# Patient Record
Sex: MALE | Race: WHITE | NOT HISPANIC OR LATINO | Employment: OTHER | URBAN - METROPOLITAN AREA
[De-identification: names, ages, dates, MRNs, and addresses within clinical notes are randomized per-mention and may not be internally consistent; named-entity substitution may affect disease eponyms.]

---

## 2017-01-18 ENCOUNTER — TELEPHONE (OUTPATIENT)
Dept: INTERNAL MEDICINE | Facility: CLINIC | Age: 82
End: 2017-01-18

## 2017-01-18 NOTE — TELEPHONE ENCOUNTER
----- Message from Muriel Garvin sent at 1/18/2017  2:29 PM CST -----  Contact: [facility]  Pt is calling nurse staff home health care regarding paper work that was ordered by staff. Zelda call back [phone] thanks

## 2017-01-26 ENCOUNTER — ANTI-COAG VISIT (OUTPATIENT)
Dept: CARDIOLOGY | Facility: CLINIC | Age: 82
End: 2017-01-26
Payer: MEDICARE

## 2017-01-26 DIAGNOSIS — Z79.01 LONG TERM (CURRENT) USE OF ANTICOAGULANTS: Primary | ICD-10-CM

## 2017-01-26 LAB
CTP QC/QA: YES
INR PPP: 2.2 (ref 2–3)

## 2017-01-26 PROCEDURE — 99999 PR PBB SHADOW E&M-EST. PATIENT-LVL I: CPT | Mod: PBBFAC,,,

## 2017-01-26 PROCEDURE — 99211 OFF/OP EST MAY X REQ PHY/QHP: CPT | Mod: PBBFAC

## 2017-01-26 PROCEDURE — 85610 PROTHROMBIN TIME: CPT | Mod: PBBFAC

## 2017-01-26 NOTE — PROGRESS NOTES
INR remains therapeutic. Patient is more mobile since recent fall. No other changes. Continue dose and diet until follow-up.

## 2017-01-26 NOTE — MR AVS SNAPSHOT
[facility]  [address] [facility]  [place] [address]  Phone: [phone]  Fax: [phone]                  Todd Lee   2017 10:30 AM   Anti-coag visit    Description:  Male : 1931   Provider:  Kesha Killian PharmD   Department:  [facility]           Diagnoses this Visit        Comments    Long term (current) use of anticoagulants    -  Primary            To Do List           Future Appointments        Provider Department Dept Phone    2017 10:30 AM Kesha Killian PharmD [facility] [phone]    2017 10:30 AM Kesha Killian PharmD [facility] [phone]    2017 8:30 AM Jalil Sorto OD [facility] [phone]    2017 2:20 PM Elmer Calderon IV, MD [facility] [phone]      Goals (5 Years of Data)     None      [facility]     [facility] On Call Nurse Care Line -  Assistance  Registered nurses in the [facility] On Call Center provide clinical advisement, health education, appointment booking, and other advisory services.  Call for this free service at [phone].             Medications           Message regarding Medications     Verify the changes and/or additions to your medication regime listed below are the same as discussed with your clinician today.  If any of these changes or additions are incorrect, please notify your healthcare provider.             Verify that the below list of medications is an accurate representation of the medications you are currently taking.  If none reported, the list may be blank. If incorrect, please contact your healthcare provider. Carry this list with you in case of emergency.           Current Medications     acetaminophen-codeine 300-30mg (TYLENOL #3) 300-30 mg Tab Take 1 tablet by mouth every 8 (eight) hours as needed.    atorvastatin (LIPITOR) 10 MG tablet TAKE 1 TABLET EVERY DAY    felodipine (PLENDIL) 2.5 MG Tb24 TAKE 1 TABLET EVERY DAY     fluticasone (FLONASE) 50 mcg/actuation nasal spray 1 spray by Each Nare route once daily.    hydrocodone-acetaminophen 10-325mg (NORCO)  mg Tab Take 1 tablet by mouth every 4 (four) hours as needed.    loratadine (CLARITIN) 10 mg tablet Take 1 tablet (10 mg total) by mouth once daily.    meclizine (ANTIVERT) 25 mg tablet Take 1 tablet (25 mg total) by mouth 3 (three) times daily as needed.    warfarin (COUMADIN) 7.5 MG tablet Take 1/2 tablet onand Fridays and 1 tablet all remaining days as directed by the coumadin clinic.           Clinical Reference Information           Allergies as of 1/26/2017     No Known Drug Allergies      Immunizations Administered on Date of Encounter - 1/26/2017     None      Orders Placed During Today's Visit      Normal Orders This Visit    POCT INR          1/26/2017 10:17 AM - Monroe NicholsD      Component Results     Component Value Flag Ref Range Units Status    INR 2.2  2.0 - 3.0  Final     Acceptable Yes    Final      December 2016 Details    Sun Mon Tue Wed Thu Fri Sat         1               2               3                 4               5               6               7               8               9               10                 11               12               13               14               15               16               17                 18               19               20               21               22               23               24                 25               26               27      7.5 mg         28      7.5 mg         29   1.9   7.5 mg   See details      30      3.75 mg         31      7.5 mg          Date Details   12/29 Last INR check   INR: 1.9                 January 2017 Details    Sun Mon Tue Wed Thu Fri Sat     1      7.5 mg         2      7.5 mg         3      7.5 mg         4      7.5 mg         5      7.5 mg         6      3.75 mg         7      7.5 mg           8      7.5 mg         9      7.5 mg          10      7.5 mg         11      7.5 mg         12      7.5 mg         13      3.75 mg         14      7.5 mg           15      7.5 mg         16      7.5 mg         17      7.5 mg         18      7.5 mg         19      7.5 mg         20      3.75 mg         21      7.5 mg           22      7.5 mg         23      7.5 mg         24      7.5 mg         25      7.5 mg         26   2.2   7.5 mg   See details      27      3.75 mg         28      7.5 mg           29      7.5 mg         30      7.5 mg         31      7.5 mg              Date Details   01/26 This INR check   INR: 2.2                     How to take your warfarin dose     To take:  3.75 mg Take 0.5 of a 7.5 mg tablet.    To take:  7.5 mg Take 1 of the 7.5 mg tablets.           February 2017 Details    Sun Mon Tue Wed Thu Fri Sat        1      7.5 mg         2      7.5 mg         3      3.75 mg         4      7.5 mg           5      7.5 mg         6      7.5 mg         7      7.5 mg         8      7.5 mg         9      7.5 mg         10      3.75 mg         11      7.5 mg           12      7.5 mg         13      7.5 mg         14      7.5 mg         15      7.5 mg         16      7.5 mg         17      3.75 mg         18      7.5 mg           19      7.5 mg         20      7.5 mg         21      7.5 mg         22      7.5 mg         23            24               25                 26               27               28                    Date Details   No additional details    Date of next INR:  2/23/2017         How to take your warfarin dose     To take:  3.75 mg Take 0.5 of a 7.5 mg tablet.    To take:  7.5 mg Take 1 of the 7.5 mg tablets.           Anticoagulation Summary as of 1/26/2017     Maintenance plan 3.75 mg (7.5 mg x 0.5) on Fri; 7.5 mg (7.5 mg x 1) all other days    Full instructions 3.75 mg on Fri; 7.5 mg all other days    Next INR check 2/23/2017      Anticoagulation Episode Summary     Comments       Patient Findings     Negatives  Signs/symptoms of thrombosis, Signs/symptoms of bleeding, Laboratory test error suspected, Change in health, Change in alcohol use, Change in activity, Upcoming invasive procedure, Emergency department visit, Upcoming dental procedure, Missed doses, Extra doses, Change in medications, Change in diet/appetite, Hospital admission, Bruising, Other complaints      [facility] Sign-Up     Activating your [facility] account is as easy as 1-2-3!     1) Visit [email or web address], select Sign Up Now, enter this activation code and your date of birth, then select Next.  [email or web address]  Expires: 3/12/2017 10:18 AM      2) Create a username and password to use when you visit [facility] in the future and select a security question in case you lose your password and select Next.    3) Enter your e-mail address and click Sign Up!    Additional Information  If you have questions, please e-mail [email or web address] or call [phone] to talk to our [facility] staff. Remember, [facility] is NOT to be used for urgent needs. For medical emergencies, dial 911.

## 2017-02-20 ENCOUNTER — HOSPITAL ENCOUNTER (EMERGENCY)
Facility: HOSPITAL | Age: 82
Discharge: HOME | End: 2017-02-20
Attending: EMERGENCY MEDICINE
Payer: MEDICARE

## 2017-02-20 VITALS
DIASTOLIC BLOOD PRESSURE: 86 MMHG | OXYGEN SATURATION: 94 % | RESPIRATION RATE: 18 BRPM | WEIGHT: 200 LBS | BODY MASS INDEX: 27.09 KG/M2 | TEMPERATURE: 98 F | HEIGHT: 72 IN | HEART RATE: 82 BPM | SYSTOLIC BLOOD PRESSURE: 146 MMHG

## 2017-02-20 DIAGNOSIS — T14.8XXA BRUISING: ICD-10-CM

## 2017-02-20 DIAGNOSIS — M54.9 BACK PAIN, UNSPECIFIED BACK LOCATION, UNSPECIFIED BACK PAIN LATERALITY, UNSPECIFIED CHRONICITY: Primary | ICD-10-CM

## 2017-02-20 LAB
BILIRUB UR QL STRIP: NEGATIVE
CLARITY UR: CLEAR
COLOR UR: YELLOW
GLUCOSE UR QL STRIP: ABNORMAL
HGB UR QL STRIP: ABNORMAL
INR PPP: 3.3
KETONES UR QL STRIP: NEGATIVE
LEUKOCYTE ESTERASE UR QL STRIP: NEGATIVE
NITRITE UR QL STRIP: NEGATIVE
PH UR STRIP: 6 [PH] (ref 5–8)
PROT UR QL STRIP: NEGATIVE
PROTHROMBIN TIME: 32.3 SEC
SP GR UR STRIP: >=1.03 (ref 1–1.03)
URN SPEC COLLECT METH UR: ABNORMAL
UROBILINOGEN UR STRIP-ACNC: 1 EU/DL

## 2017-02-20 PROCEDURE — 81003 URINALYSIS AUTO W/O SCOPE: CPT

## 2017-02-20 PROCEDURE — 99284 EMERGENCY DEPT VISIT MOD MDM: CPT

## 2017-02-20 PROCEDURE — 85610 PROTHROMBIN TIME: CPT

## 2017-02-20 NOTE — ED AVS SNAPSHOT
[facility]  [address] [facility] [address]               Todd Lee   2017  9:13 PM   ED    Description:  Male : 1931   Department:  [facility]            Your Care was Coordinated By:     Provider Role From To    Mandy Lu MD Attending Provider [phone] --      Reason for Visit     Bleeding/Bruising           Diagnoses this Visit        Comments    Back pain, unspecified back location, unspecified back pain laterality, unspecified chronicity    -  Primary     Bruising           ED Disposition     ED Disposition Condition Comment    Discharge             To Do List           [facility]     [facility] Care [facility] -  Assistance  Registered nurses in the [facility] provide clinical advisement, health education, appointment booking, and other advisory services.  Call for this free service at [phone].             Medications           Message regarding Medications     Verify the changes and/or additions to your medication regime listed below are the same as discussed with your clinician today.  If any of these changes or additions are incorrect, please notify your healthcare provider.             Verify that the below list of medications is an accurate representation of the medications you are currently taking.  If none reported, the list may be blank. If incorrect, please contact your healthcare provider. Carry this list with you in case of emergency.           Current Medications     acetaminophen-codeine 300-30mg (TYLENOL #3) 300-30 mg Tab Take 1 tablet by mouth every 8 (eight) hours as needed.    atorvastatin (LIPITOR) 10 MG tablet TAKE 1 TABLET EVERY DAY    felodipine (PLENDIL) 2.5 MG Tb24 TAKE 1 TABLET EVERY DAY    fluticasone (FLONASE) 50 mcg/actuation nasal spray 1 spray by Each Nare route once daily.    hydrocodone-acetaminophen 10-325mg (NORCO)  mg Tab Take 1 tablet by  mouth every 4 (four) hours as needed.    loratadine (CLARITIN) 10 mg tablet Take 1 tablet (10 mg total) by mouth once daily.    meclizine (ANTIVERT) 25 mg tablet Take 1 tablet (25 mg total) by mouth 3 (three) times daily as needed.    warfarin (COUMADIN) 7.5 MG tablet Take 1/2 tablet onand Fridays and 1 tablet all remaining days as directed by the coumadin clinic.           Clinical Reference Information           Your Vitals Were     BP Pulse Temp Resp Height Weight    146/86 (BP Location: Right arm, Patient Position: Sitting, BP Method: Automatic) 82 98 °F (36.7 °C) (Oral) 18 6' (1.829 m) 90.7 kg (200 lb)    SpO2 BMI             94% 27.12 kg/m2         Allergies as of 2/20/2017        Reactions    No Known Drug Allergies       Immunizations Administered on Date of Encounter - 2/20/2017     None      ED Micro, Lab, POCT     Start Ordered       Status Ordering Provider    02/20/17 2114 02/20/17 2113  Urinalysis  STAT      Final result     02/20/17 2051 02/20/17 2051  Protime-INR  STAT      Final result       ED Imaging Orders     Start Ordered       Status Ordering Provider    02/20/17 2114 02/20/17 2113  X-Ray Lumbar Spine Complete 5 View  1 time imaging      Final result         Discharge Instructions         Self-Care for Low Back Pain    Most people have low back pain now and then. In many cases, it isnt serious and self-care can help. Sometimes low back pain can be a sign of a bigger problem. Call your healthcare provider if your pain returns often or gets worse over time. For the long-term care of your back, get regular exercise, lose any excess weight and learn good posture.  Take a short rest  Lying down during the day may be beneficial for short periods of time if severe pain increases with sitting or standing. Long-term bed rest could be detrimental.  Reduce pain and swelling  Cold reduces swelling. Both cold and heat can reduce pain. Protect your skin by placing a towel between your body and the ice or  heat source.  · For the first few days, apply an ice pack for 15 to 20 minutes .  · After the first few days, try heat for 15 minutes at a time to ease pain. Never sleep on a heating pad.  · Over-the-counter medicine can help control pain and swelling. Try aspirin or ibuprofen.  Exercise  Exercise can help your back heal. It also helps your back get stronger and more flexible, preventing any reinjury. Ask your healthcare provider about specific exercises for your back.  Use good posture to avoid reinjury  · When moving, bend at the hips and knees. Dont bend at the waist or twist around.  · When lifting, keep the object close to your body. Dont try to lift more than you can handle.  · When sitting, keep your lower back supported. Use a rolled-up towel as needed.  Seek immediate medical care if:  · Youre unable to stand or walk.  · You have a temperature over 100.4°F (38.0°C)  · You have frequent, painful, or bloody urination.  · You have severe abdominal pain.  · You have a sharp, stabbing pain.  · Your pain is constant.  · You have pain or numbness in your leg.  · You feel pain in a new area of your back.  · You notice that the pain isnt decreasing after more than a week.   Date Last Reviewed: 9/29/2015  © 9495-2100 [employer]. [address] [place] [address]. All rights reserved. This information is not intended as a substitute for professional medical care. Always follow your healthcare professional's instructions.          Your Scheduled Appointments     Feb 23, 2017 11:00 AM CST   [facility]madin with [facility] ([facility]    [address] [place] [id number]   [phone]            Feb 23, 2017 11:20 AM CST   Established Patient Visit with MD MARCO ANTONIO [facility] ([facility]    [address] [place] [address]   [phone]            Sep 27, 2017  8:30 AM CDT   Established Patient Visit with  Jalil Sorto OD   [facility]    [address] [place] [address]   [phone]            Dec 21, 2017  2:20 PM CST   Established Patient Visit with Elmer Calderon IV, MD   [facility] ([facility])    [address] [place] [address]   [phone]              [facility] Sign-Up     Activating your [facility] account is as easy as 1-2-3!     1) Visit [email or web address], select Sign Up Now, enter this activation code and your date of birth, then select Next.  [email or web address]  Expires: 3/12/2017 10:18 AM      2) Create a username and password to use when you visit [facility] in the future and select a security question in case you lose your password and select Next.    3) Enter your e-mail address and click Sign Up!    Additional Information  If you have questions, please e-mail [email or web address] or call [phone] to talk to our [facility] staff. Remember, [facility] is NOT to be used for urgent needs. For medical emergencies, dial 911.          [facility] complies with applicable Federal civil rights laws and does not discriminate on the basis of race, color, national origin, age, disability, or sex.        Language Assistance Services     ATTENTION: Language assistance services are available, free of charge. Please call [phone].      ATENCIÓN: Si habla español, tiene a livingston disposición servicios gratuitos de asistencia lingüística. Llame al [phone].     CHÚ Ý: N?u b?n nói Ti?ng Vi?t, có các d?ch v? h? tr? ngôn ng? mi?n phí dành cho b?n. G?i s? [phone].

## 2017-02-21 NOTE — ED PROVIDER NOTES
SCRIBE #1 NOTE: I, Anne Enrique, am scribing for, and in the presence of, Mandy Lu MD. I have scribed the entire note.      History      Chief Complaint   Patient presents with    Bleeding/Bruising     pt takes coumadin. reports bruising to L leg.        Review of patient's allergies indicates:   Allergen Reactions    No known drug allergies         HPI   HPI    2/20/2017, 9:11 PM   History obtained from the patient and family members      History of Present Illness: Todd Lee is a 85 y.o. male patient with PHHx of blood clots in heart and lungs who presents to the Emergency Department for bleeding and bruising that was just noticed today. Bruising is located to L leg. Per family members, pt fell about 2 weeks ago. No mitigating or exacerbating factors reported. No associated sxs at this time. Pt also c/o back pain and frequent urination during the night. Patient denies any fever, chills, CP, SOB, saddle anesthesia, bowel/bladder incontinence, and all other sxs at this time. Pt currently on Coumadin. No further complaints or concerns at this time.       Arrival mode: Personal vehicle    PCP: Carmelita Sanchez MD       Past Medical History:  Past Medical History   Diagnosis Date    Blood clotting disorder     Cataract      both eyes in [place]    Diabetes mellitus type II 09/22/2016     no medicine today no BS    Elevated PSA      Has had evaluation by Dr. Blackman; Recommended against PSA test and advised annual CHANDAN    Hyperlipidemia     Hypertension     Osteoarthritis        Past Surgical History:  Past Surgical History   Procedure Laterality Date    Left inguinal hernia repair      Colonoscopy           Family History:  Family History   Problem Relation Age of Onset    Stroke Mother     Cancer Father        Social History:  Social History     Social History Main Topics    Smoking status: Never Smoker    Smokeless tobacco: Never Used    Alcohol use No    Drug use: No    Sexual  activity: Not Currently       ROS   Review of Systems   Constitutional: Negative for fever.   HENT: Negative for sore throat.    Respiratory: Negative for shortness of breath.    Cardiovascular: Negative for chest pain.   Gastrointestinal: Negative for nausea.        (-) bowel incontinence   Genitourinary: Positive for frequency. Negative for dysuria.        (-) bladder incontinence   Musculoskeletal: Positive for back pain.   Skin: Negative for rash.        (+) bruising to L leg   Neurological: Negative for weakness.        (-) saddle anesthesia   Hematological: Bruises/bleeds easily.   All other systems reviewed and are negative.      Physical Exam    Initial Vitals   BP Pulse Resp Temp SpO2   02/20/17 1915 02/20/17 1915 02/20/17 1915 02/20/17 1915 02/20/17 1915   151/99 75 20 98 °F (36.7 °C) 93 %      Physical Exam  Nursing Notes and Vital Signs Reviewed.  Constitutional: Patient is in no acute distress. Awake and alert. Well-developed and well-nourished.  Head: Atraumatic. Normocephalic.  Eyes: PERRL. EOM intact. Conjunctivae are not pale. No scleral icterus.  ENT: Mucous membranes are moist. Oropharynx is clear and symmetric.    Neck: Supple. Full ROM. No lymphadenopathy.  Cardiovascular: Regular rate. Regular rhythm. No murmurs, rubs, or gallops. Distal pulses are 2+ and symmetric.  Pulmonary/Chest: No respiratory distress. Clear to auscultation bilaterally. No wheezing, rales, or rhonchi.  Abdominal: Soft and non-distended.  There is no tenderness.  No rebound, guarding, or rigidity. Good bowel sounds.  Genitourinary: No CVA tenderness  Musculoskeletal: Moves all extremities. No obvious deformities. No edema. No calf tenderness.  Skin: Warm and dry. Bruising to L leg.  Neurological:  Alert, awake, and appropriate.  Normal speech.  No acute focal neurological deficits are appreciated.  Psychiatric: Normal affect. Good eye contact. Appropriate in content.    ED Course    Procedures  ED Vital Signs:  Vitals:     02/20/17 1915 02/20/17 2228   BP: (!) 151/99 (!) 146/86   Pulse: 75 82   Resp: 20 18   Temp: 98 °F (36.7 °C)    TempSrc: Oral    SpO2: (!) 93% (!) 94%   Weight: 90.7 kg (200 lb)    Height: 6' (1.829 m)        Abnormal Lab Results:  Labs Reviewed   PROTIME-INR - Abnormal; Notable for the following:        Result Value    Prothrombin Time 32.3 (*)     INR 3.3 (*)     All other components within normal limits   URINALYSIS - Abnormal; Notable for the following:     Specific Gravity, UA >=1.030 (*)     Glucose, UA 2+ (*)     Occult Blood UA Trace (*)     All other components within normal limits        All Lab Results:  Results for orders placed or performed during the hospital encounter of 02/20/17   Protime-INR   Result Value Ref Range    Prothrombin Time 32.3 (H) 9.0 - 12.5 sec    INR 3.3 (H) 0.8 - 1.2   Urinalysis   Result Value Ref Range    Specimen UA Urine, Clean Catch     Color, UA Yellow Yellow, Straw, Madai    Appearance, UA Clear Clear    pH, UA 6.0 5.0 - 8.0    Specific Gravity, UA >=1.030 (A) 1.005 - 1.030    Protein, UA Negative Negative    Glucose, UA 2+ (A) Negative    Ketones, UA Negative Negative    Bilirubin (UA) Negative Negative    Occult Blood UA Trace (A) Negative    Nitrite, UA Negative Negative    Urobilinogen, UA 1.0 <2.0 EU/dL    Leukocytes, UA Negative Negative         Imaging Results:  Imaging Results         X-Ray Lumbar Spine Complete 5 View (Final result) Result time:  02/20/17 21:50:38    Final result by Yessenia Mack MD (02/20/17 21:50:38)    Impression:      1.  There is some mild compression deformity of superior endplate of L3.  This is of uncertain age.  Lesser compression of the L1 vertebral body superior endplate is present.  2.  Chronic degenerative changes are present greatest at the L4-L5 level.      Electronically signed by: YESSENIA MACK MD  Date:     02/20/17  Time:    21:50     Narrative:    Exam: XR LUMBAR SPINE COMPLETE 5 VIEW,    Date:  02/20/17 21:34:57    History:  No history is provided.    Comparison:  No prior relevant studies available    Findings: There is compression deformity of the superior endplate of L3 vertebral body with mild loss of height of the vertebral body at this level.  Some lesser loss of height superior endplate of L1 is present.  The bones are demineralized with some chronic spurring and degenerative change present.  There is moderate disc space narrowing at the L4-L5 level.                      The Emergency Provider reviewed the vital signs and test results, which are outlined above.    ED Discussion       11:05 PM: Re-evaluated pt. D/w pt all pertinent results. D/w pt any concerns expressed at this time. Answered all questions. Pt expresses understanding at this time.    11:05 PM: Reassessed pt at this time. Discussed with pt all pertinent ED information and results. Discussed pt dx and plan of tx. Gave pt all f/u and return to the ED instructions. All questions and concerns were addressed at this time. Pt expresses understanding of information and instructions, and is comfortable with plan to discharge. Pt is stable for discharge.    Pre-hypertension/Hypertension: The pt has been informed that they may have pre-hypertension or hypertension based on a blood pressure reading in the ED. I recommend that the pt call the PCP listed on their discharge instructions or a physician of their choice this week to arrange f/u for further evaluation of possible pre-hypertension or hypertension.       ED Medication(s):  Medications - No data to display    Discharge Medication List as of 2/20/2017 10:36 PM                Medical Decision Making    Medical Decision Making:   Clinical Tests:   Lab Tests: Ordered and Reviewed  Radiological Study: Ordered and Reviewed           Scribe Attestation:   Scribe #1: I performed the above scribed service and the documentation accurately describes the services I performed. I attest to the accuracy of the note.    Attending:    Physician Attestation Statement for Scribe #1: I, Mandy Lu MD, personally performed the services described in this documentation, as scribed by Anne Enrique, in my presence, and it is both accurate and complete.          Clinical Impression       ICD-10-CM ICD-9-CM   1. Back pain, unspecified back location, unspecified back pain laterality, unspecified chronicity M54.9 724.5   2. Bruising T14.8 924.9       Disposition:   Disposition: Discharged  Condition: Stable         Mandy Lu MD  02/21/17 0556

## 2017-02-21 NOTE — DISCHARGE INSTRUCTIONS
Self-Care for Low Back Pain    Most people have low back pain now and then. In many cases, it isnt serious and self-care can help. Sometimes low back pain can be a sign of a bigger problem. Call your healthcare provider if your pain returns often or gets worse over time. For the long-term care of your back, get regular exercise, lose any excess weight and learn good posture.  Take a short rest  Lying down during the day may be beneficial for short periods of time if severe pain increases with sitting or standing. Long-term bed rest could be detrimental.  Reduce pain and swelling  Cold reduces swelling. Both cold and heat can reduce pain. Protect your skin by placing a towel between your body and the ice or heat source.  · For the first few days, apply an ice pack for 15 to 20 minutes .  · After the first few days, try heat for 15 minutes at a time to ease pain. Never sleep on a heating pad.  · Over-the-counter medicine can help control pain and swelling. Try aspirin or ibuprofen.  Exercise  Exercise can help your back heal. It also helps your back get stronger and more flexible, preventing any reinjury. Ask your healthcare provider about specific exercises for your back.  Use good posture to avoid reinjury  · When moving, bend at the hips and knees. Dont bend at the waist or twist around.  · When lifting, keep the object close to your body. Dont try to lift more than you can handle.  · When sitting, keep your lower back supported. Use a rolled-up towel as needed.  Seek immediate medical care if:  · Youre unable to stand or walk.  · You have a temperature over 100.4°F (38.0°C)  · You have frequent, painful, or bloody urination.  · You have severe abdominal pain.  · You have a sharp, stabbing pain.  · Your pain is constant.  · You have pain or numbness in your leg.  · You feel pain in a new area of your back.  · You notice that the pain isnt decreasing after more than a week.   Date Last Reviewed: 9/29/2015  ©  9386-3652 [employer]. [address] [place] [address], [place] [address]. All rights reserved. This information is not intended as a substitute for professional medical care. Always follow your healthcare professional's instructions.

## 2017-02-23 ENCOUNTER — OFFICE VISIT (OUTPATIENT)
Dept: INTERNAL MEDICINE | Facility: CLINIC | Age: 82
End: 2017-02-23
Payer: MEDICARE

## 2017-02-23 ENCOUNTER — ANTI-COAG VISIT (OUTPATIENT)
Dept: CARDIOLOGY | Facility: CLINIC | Age: 82
End: 2017-02-23
Payer: MEDICARE

## 2017-02-23 VITALS
HEART RATE: 93 BPM | SYSTOLIC BLOOD PRESSURE: 118 MMHG | BODY MASS INDEX: 28.25 KG/M2 | HEIGHT: 72 IN | WEIGHT: 208.56 LBS | TEMPERATURE: 97 F | OXYGEN SATURATION: 97 % | DIASTOLIC BLOOD PRESSURE: 62 MMHG

## 2017-02-23 DIAGNOSIS — M54.50 ACUTE MIDLINE LOW BACK PAIN WITHOUT SCIATICA: Primary | ICD-10-CM

## 2017-02-23 DIAGNOSIS — S32.030D COMPRESSION FRACTURE OF L3 LUMBAR VERTEBRA, WITH ROUTINE HEALING, SUBSEQUENT ENCOUNTER: ICD-10-CM

## 2017-02-23 DIAGNOSIS — Z79.01 LONG TERM (CURRENT) USE OF ANTICOAGULANTS: Primary | ICD-10-CM

## 2017-02-23 DIAGNOSIS — S32.010D: ICD-10-CM

## 2017-02-23 LAB — INR PPP: 3.7 (ref 2–3)

## 2017-02-23 PROCEDURE — 99213 OFFICE O/P EST LOW 20 MIN: CPT | Mod: PBBFAC | Performed by: FAMILY MEDICINE

## 2017-02-23 PROCEDURE — 99214 OFFICE O/P EST MOD 30 MIN: CPT | Mod: S$PBB,,, | Performed by: FAMILY MEDICINE

## 2017-02-23 PROCEDURE — 99999 PR PBB SHADOW E&M-EST. PATIENT-LVL I: CPT | Mod: PBBFAC,,,

## 2017-02-23 PROCEDURE — 99999 PR PBB SHADOW E&M-EST. PATIENT-LVL III: CPT | Mod: PBBFAC,,, | Performed by: FAMILY MEDICINE

## 2017-02-23 RX ORDER — HYDROCODONE BITARTRATE AND ACETAMINOPHEN 10; 325 MG/1; MG/1
1 TABLET ORAL EVERY 4 HOURS PRN
Qty: 20 TABLET | Refills: 0 | Status: SHIPPED | OUTPATIENT
Start: 2017-02-23

## 2017-02-23 NOTE — PROGRESS NOTES
INR is supra-therapeutic. No bleeding issues. More tylenol usage this week for back pain. Hold x1 dose then re-challenge dose until follow-up.

## 2017-02-23 NOTE — MR AVS SNAPSHOT
[facility]  [address] [facility] [address]  Phone: [phone]  Fax: [phone]                  Todd Lee   2017 11:20 AM   Office Visit    Description:  Male : 1931   Provider:  Marjan Forman MD   Department:  [facility]           Reason for Visit     Back Pain     Fall     Hospital Follow Up           Diagnoses this Visit        Comments    Acute midline low back pain without sciatica    -  Primary     Compression fracture of first lumbar vertebra, with routine healing, subsequent encounter         Compression fracture of L3 lumbar vertebra, with routine healing, subsequent encounter                To Do List           Future Appointments        Provider Department Dept Phone    3/16/2017 11:00 AM Kesha Killian PharmD [facility] Coumadin [phone]    2017 8:30 AM Jalil Sorto OD [facility] - Ophthalmology [phone]    2017 2:20 PM Elmer Calderon IV, MD [facility] - Urology [phone]      Goals (5 Years of Data)     None       These Medications        Disp Refills Start End    hydrocodone-acetaminophen 10-325mg (NORCO)  mg Tab 20 tablet 0 2017     Take 1 tablet by mouth every 4 (four) hours as needed. - Oral    Pharmacy: [facility] [address] #: [phone]         [facility]     [facility] On Call Nurse Care Line -  Assistance  Registered nurses in the [facility] On Call Center provide clinical advisement, health education, appointment booking, and other advisory services.  Call for this free service at [phone].             Medications           Message regarding Medications     Verify the changes and/or additions to your medication regime listed below are the same as discussed with your clinician today.  If any of these changes or additions are incorrect, please notify your healthcare provider.             Verify that the below list of  medications is an accurate representation of the medications you are currently taking.  If none reported, the list may be blank. If incorrect, please contact your healthcare provider. Carry this list with you in case of emergency.           Current Medications     acetaminophen-codeine 300-30mg (TYLENOL #3) 300-30 mg Tab Take 1 tablet by mouth every 8 (eight) hours as needed.    atorvastatin (LIPITOR) 10 MG tablet TAKE 1 TABLET EVERY DAY    felodipine (PLENDIL) 2.5 MG Tb24 TAKE 1 TABLET EVERY DAY    fluticasone (FLONASE) 50 mcg/actuation nasal spray 1 spray by Each Nare route once daily.    hydrocodone-acetaminophen 10-325mg (NORCO)  mg Tab Take 1 tablet by mouth every 4 (four) hours as needed.    loratadine (CLARITIN) 10 mg tablet Take 1 tablet (10 mg total) by mouth once daily.    meclizine (ANTIVERT) 25 mg tablet Take 1 tablet (25 mg total) by mouth 3 (three) times daily as needed.    warfarin (COUMADIN) 7.5 MG tablet Take 1/2 tablet onand Fridays and 1 tablet all remaining days as directed by the coumadin clinic.           Clinical Reference Information           Your Vitals Were     BP Pulse Temp Height    118/62 (BP Location: Right arm, Patient Position: Sitting, BP Method: Manual) 93 96.5 °F (35.8 °C) (Tympanic) 6' (1.829 m)    Weight SpO2 BMI    94.6 kg (208 lb 8.9 oz) 97% 28.29 kg/m2      Blood Pressure          Most Recent Value    BP  118/62      Allergies as of 2/23/2017     No Known Drug Allergies      Immunizations Administered on Date of Encounter - 2/23/2017     None      Orders Placed During Today's Visit      Normal Orders This Visit    Ambulatory referral to Home Health       [facility] Sign-Up     Activating your [facility] account is as easy as 1-2-3!     1) Visit [email or web address], select Sign Up Now, enter this activation code and your date of birth, then select Next.  [email or web address]  Expires: 3/12/2017 10:18 AM      2) Create a username and password to use when you visit [facility] in the  future and select a security question in case you lose your password and select Next.    3) Enter your e-mail address and click Sign Up!    Additional Information  If you have questions, please e-mail [email or web address] or call [phone] to talk to our [facility] staff. Remember, [facility] is NOT to be used for urgent needs. For medical emergencies, dial 911.         Language Assistance Services     ATTENTION: Language assistance services are available, free of charge. Please call [phone].      ATENCIÓN: Si habla español, tiene a livingston disposición servicios gratuitos de asistencia lingüística. Llame al [phone].     CHÚ Ý: N?u b?n nói Ti?ng Vi?t, có các d?ch v? h? tr? ngôn ng? mi?n phí dành cho b?n. G?i s? [phone].         [facility] complies with applicable Federal civil rights laws and does not discriminate on the basis of race, color, national origin, age, disability, or sex.

## 2017-02-23 NOTE — MR AVS SNAPSHOT
[facility]  [address] [facility]  [place] [address]  Phone: [phone]  Fax: [phone]                  Todd Lee   2017 11:00 AM   Anti-coag visit    Description:  Male : 1931   Provider:  Kesha Killian PharmD   Department:  [facility]           Diagnoses this Visit        Comments    Long term (current) use of anticoagulants    -  Primary            To Do List           Future Appointments        Provider Department Dept Phone    2017 11:20 AM Marjan Forman MD [facility] [phone]    3/16/2017 11:00 AM Kesha Kililan PharmD [facility] [phone]    2017 8:30 AM Jalil Sorto OD [facility] [phone]    2017 2:20 PM [facility] Calder[facility] [phone]      Goals (5 Years of Data)     None      [facility] On Call     [facility] On Call Nurse Care Line -  Assistance  Registered nurses in the [facility] On Call Center provide clinical advisement, health education, appointment booking, and other advisory services.  Call for this free service at [phone].             Medications           Message regarding Medications     Verify the changes and/or additions to your medication regime listed below are the same as discussed with your clinician today.  If any of these changes or additions are incorrect, please notify your healthcare provider.             Verify that the below list of medications is an accurate representation of the medications you are currently taking.  If none reported, the list may be blank. If incorrect, please contact your healthcare provider. Carry this list with you in case of emergency.           Current Medications     acetaminophen-codeine 300-30mg (TYLENOL #3) 300-30 mg Tab Take 1 tablet by mouth every 8 (eight) hours as needed.    atorvastatin (LIPITOR) 10 MG tablet TAKE 1 TABLET EVERY DAY    felodipine (PLENDIL) 2.5 MG Tb24 TAKE 1 TABLET EVERY  DAY    fluticasone (FLONASE) 50 mcg/actuation nasal spray 1 spray by Each Nare route once daily.    hydrocodone-acetaminophen 10-325mg (NORCO)  mg Tab Take 1 tablet by mouth every 4 (four) hours as needed.    loratadine (CLARITIN) 10 mg tablet Take 1 tablet (10 mg total) by mouth once daily.    meclizine (ANTIVERT) 25 mg tablet Take 1 tablet (25 mg total) by mouth 3 (three) times daily as needed.    warfarin (COUMADIN) 7.5 MG tablet Take 1/2 tablet onand Fridays and 1 tablet all remaining days as directed by the coumadin clinic.           Clinical Reference Information           Allergies as of 2/23/2017     No Known Drug Allergies      Immunizations Administered on Date of Encounter - 2/23/2017     None      Orders Placed During Today's Visit      Normal Orders This Visit    POCT INR          2/23/2017 11:07 AM - Kesha Killian, PharmD      Component Results     Component Value Flag Ref Range Units Status    INR 3.7 (A) 2.0 - 3.0  Final      January 2017 Details    Sun Mon Tue Wed Thu Fri Sat     1               2               3               4               5               6               7                 8               9               10               11               12               13               14                 15               16               17               18               19               20               21                 22               23               24      7.5 mg         25      7.5 mg         26   2.2   7.5 mg   See details      27      3.75 mg         28      7.5 mg           29      7.5 mg         30      7.5 mg         31      7.5 mg              Date Details   01/26 Last INR check   INR: 2.2                 February 2017 Details    Sun Mon Tue Wed Thu Fri Sat        1      7.5 mg         2      7.5 mg         3      3.75 mg         4      7.5 mg           5      7.5 mg         6      7.5 mg         7      7.5 mg         8      7.5 mg         9      7.5 mg         10       3.75 mg         11      7.5 mg           12      7.5 mg         13      7.5 mg         14      7.5 mg         15      7.5 mg         16      7.5 mg         17      3.75 mg         18      7.5 mg           19      7.5 mg         20   3.3   7.5 mg   See details      21      7.5 mg         22      7.5 mg         23   3.7   Hold   See details      24      3.75 mg         25      7.5 mg           26      7.5 mg         27      7.5 mg         28      7.5 mg              Date Details   02/20 INR: 3.3   Coumadin Therapy: 2.0 - 3.0 for INR for all indicators except mechanical heart valves and antiphospholipid syndromes which should use 2.5 - 3.5.       02/23 This INR check   INR: 3.7                     How to take your warfarin dose     To take:  3.75 mg Take 0.5 of a 7.5 mg tablet.    To take:  7.5 mg Take 1 of the 7.5 mg tablets.    Hold Do not take your warfarin dose. See the Details table to the right for additional instructions.                March 2017 Details    Sun Mon Tue Wed Thu Fri Sat        1      7.5 mg         2      7.5 mg         3      3.75 mg         4      7.5 mg           5      7.5 mg         6      7.5 mg         7      7.5 mg         8      7.5 mg         9      7.5 mg         10      3.75 mg         11      7.5 mg           12      7.5 mg         13      7.5 mg         14      7.5 mg         15      7.5 mg         16            17               18                 19               20               21               22               23               24               25                 26               27               28               29               30               31                 Date Details   No additional details    Date of next INR:  3/16/2017         How to take your warfarin dose     To take:  3.75 mg Take 0.5 of a 7.5 mg tablet.    To take:  7.5 mg Take 1 of the 7.5 mg tablets.           Anticoagulation Summary as of 2/23/2017     Maintenance plan 3.75 mg (7.5 mg x 0.5) on Fri; 7.5 mg  (7.5 mg x 1) all other days    Full instructions 2/23: Hold; Otherwise 3.75 mg on Fri; 7.5 mg all other days    Next INR check 3/16/2017      Anticoagulation Episode Summary     Comments       Patient Findings     Negatives Signs/symptoms of thrombosis, Signs/symptoms of bleeding, Laboratory test error suspected, Change in health, Change in alcohol use, Change in activity, Upcoming invasive procedure, Emergency department visit, Upcoming dental procedure, Missed doses, Extra doses, Change in medications, Change in diet/appetite, Hospital admission, Bruising, Other complaints      [facility] Sign-Up     Activating your [facility] account is as easy as 1-2-3!     1) Visit [email or web address], select Sign Up Now, enter this activation code and your date of birth, then select Next.  [email or web address]  Expires: 3/12/2017 10:18 AM      2) Create a username and password to use when you visit [facility] in the future and select a security question in case you lose your password and select Next.    3) Enter your e-mail address and click Sign Up!    Additional Information  If you have questions, please e-mail [email or web address] or call [phone] to talk to our [facility] staff. Remember, [facility] is NOT to be used for urgent needs. For medical emergencies, dial 911.         Language Assistance Services     ATTENTION: Language assistance services are available, free of charge. Please call [phone].      ATENCIÓN: Si habla español, tiene a livingston disposición servicios gratuitos de asistencia lingüística. Llame al [phone].     CHÚ Ý: N?u b?n nói Ti?ng Vi?t, có các d?ch v? h? tr? ngôn ng? mi?n phí dành cho b?n. G?i s? [phone].         [facility] complies with applicable Federal civil rights laws and does not discriminate on the basis of race, color, national origin, age, disability, or sex.

## 2017-02-24 NOTE — PROGRESS NOTES
Subjective:       Patient ID: Todd Lee is a 85 y.o. male.    Chief Complaint: Back Pain; Fall; and Hospital Follow Up    HPI Mr. Lee was seen in the ED a few days ago below is the hpi (he is still having back pain) Tylenol helps some. He had norco from previous incident with foot pain but does not take them often because of cause of constipation. His daughter request PT if possible at home which helped with previous injury. He was found to have 2 compression fractures. His pain is in the low back in the middle.     History of Present Illness: Todd Lee is a 85 y.o. male patient with PHHx of blood clots in heart and lungs who presents to the Emergency Department for bleeding and bruising that was just noticed today. Bruising is located to L leg. Per family members, pt fell about 2 weeks ago. No mitigating or exacerbating factors reported. No associated sxs at this time. Pt also c/o back pain and frequent urination during the night. Patient denies any fever, chills, CP, SOB, saddle anesthesia, bowel/bladder incontinence, and all other sxs at this time. Pt currently on Coumadin. No further complaints or concerns at this time.    Review of Systems   Constitutional: Negative for chills, fatigue and fever.   Respiratory: Negative for cough, chest tightness, shortness of breath and wheezing.    Cardiovascular: Negative for chest pain and leg swelling.   Musculoskeletal: Positive for back pain. Negative for joint swelling.   Neurological: Negative for dizziness and headaches.           Past Medical History:   Diagnosis Date    Blood clotting disorder     Cataract     both eyes in [place]    Diabetes mellitus type II 09/22/2016    no medicine today no BS    Elevated PSA     Has had evaluation by Dr. Blackman; Recommended against PSA test and advised annual CHANDAN    Hyperlipidemia     Hypertension     Osteoarthritis      Past Surgical History:   Procedure Laterality Date    COLONOSCOPY       left inguinal hernia repair       Family History   Problem Relation Age of Onset    Stroke Mother     Cancer Father      Social History     Social History    Marital status:      Spouse name: N/A    Number of children: N/A    Years of education: N/A     Social History Main Topics    Smoking status: Never Smoker    Smokeless tobacco: Never Used    Alcohol use No    Drug use: No    Sexual activity: Not Currently     Other Topics Concern    None     Social History Narrative       Objective:        Physical Exam   Constitutional: He is oriented to person, place, and time. He appears well-developed and well-nourished. He does not appear ill.   HENT:   Head: Normocephalic and atraumatic.   Right Ear: External ear normal.   Left Ear: External ear normal.   Eyes: EOM are normal. Pupils are equal, round, and reactive to light.   Neck: Normal range of motion. Neck supple.   Cardiovascular:   No murmur heard.  Pulmonary/Chest: Effort normal and breath sounds normal. No respiratory distress. He has no wheezes.   Musculoskeletal: Normal range of motion. He exhibits no edema.        Back:    Neurological: He is alert and oriented to person, place, and time. Coordination normal.   Skin: Skin is warm.   Psychiatric: He has a normal mood and affect. His behavior is normal.   Nursing note and vitals reviewed.        Assessment/Plan:     Acute midline low back pain without sciatica  -     hydrocodone-acetaminophen 10-325mg (NORCO)  mg Tab; Take 1 tablet by mouth every 4 (four) hours as needed.  Dispense: 20 tablet; Refill: 0  -     Ambulatory referral to Home Health    Compression fracture of first lumbar vertebra, with routine healing, subsequent encounter  -     hydrocodone-acetaminophen 10-325mg (NORCO)  mg Tab; Take 1 tablet by mouth every 4 (four) hours as needed.  Dispense: 20 tablet; Refill: 0  -     Ambulatory referral to Home Health    Compression fracture of L3 lumbar vertebra, with routine  healing, subsequent encounter  -     hydrocodone-acetaminophen 10-325mg (NORCO)  mg Tab; Take 1 tablet by mouth every 4 (four) hours as needed.  Dispense: 20 tablet; Refill: 0  -     Ambulatory referral to Home Health      No Follow-up on file.    Marjan Forman MD  ON   Family Medicine

## 2017-02-27 ENCOUNTER — TELEPHONE (OUTPATIENT)
Dept: INTERNAL MEDICINE | Facility: CLINIC | Age: 82
End: 2017-02-27

## 2017-03-01 ENCOUNTER — HOSPITAL ENCOUNTER (INPATIENT)
Facility: HOSPITAL | Age: 82
LOS: 4 days | Discharge: HOME HEALTH | DRG: 189 | End: 2017-03-05
Attending: EMERGENCY MEDICINE | Admitting: FAMILY MEDICINE
Payer: MEDICARE

## 2017-03-01 DIAGNOSIS — M19.90 OSTEOARTHRITIS, UNSPECIFIED OSTEOARTHRITIS TYPE, UNSPECIFIED SITE: ICD-10-CM

## 2017-03-01 DIAGNOSIS — I10 ESSENTIAL HYPERTENSION: Chronic | ICD-10-CM

## 2017-03-01 DIAGNOSIS — R97.20 ELEVATED PSA: ICD-10-CM

## 2017-03-01 DIAGNOSIS — J10.1 INFLUENZA A: ICD-10-CM

## 2017-03-01 DIAGNOSIS — D68.9 BLOOD CLOTTING DISORDER: ICD-10-CM

## 2017-03-01 DIAGNOSIS — R05.9 COUGH: ICD-10-CM

## 2017-03-01 DIAGNOSIS — E78.5 HYPERLIPIDEMIA, UNSPECIFIED HYPERLIPIDEMIA TYPE: Chronic | ICD-10-CM

## 2017-03-01 DIAGNOSIS — R09.02 HYPOXIA: Primary | ICD-10-CM

## 2017-03-01 DIAGNOSIS — R06.02 SOB (SHORTNESS OF BREATH): ICD-10-CM

## 2017-03-01 DIAGNOSIS — E11.9 TYPE 2 DIABETES MELLITUS WITHOUT COMPLICATION, WITHOUT LONG-TERM CURRENT USE OF INSULIN: Chronic | ICD-10-CM

## 2017-03-01 DIAGNOSIS — Z86.711 HISTORY OF PULMONARY EMBOLISM: ICD-10-CM

## 2017-03-01 DIAGNOSIS — J96.01 ACUTE HYPOXEMIC RESPIRATORY FAILURE: ICD-10-CM

## 2017-03-01 DIAGNOSIS — R53.1 GENERALIZED WEAKNESS: ICD-10-CM

## 2017-03-01 LAB
ALBUMIN SERPL BCP-MCNC: 3.7 G/DL
ALLENS TEST: ABNORMAL
ALP SERPL-CCNC: 120 U/L
ALT SERPL W/O P-5'-P-CCNC: 28 U/L
ANION GAP SERPL CALC-SCNC: 12 MMOL/L
APTT BLDCRRT: 28.5 SEC
AST SERPL-CCNC: 21 U/L
BASOPHILS # BLD AUTO: 0.03 K/UL
BASOPHILS NFR BLD: 0.3 %
BILIRUB SERPL-MCNC: 1.2 MG/DL
BILIRUB UR QL STRIP: NEGATIVE
BNP SERPL-MCNC: 71 PG/ML
BUN SERPL-MCNC: 14 MG/DL
CALCIUM SERPL-MCNC: 9.3 MG/DL
CHLORIDE SERPL-SCNC: 105 MMOL/L
CK SERPL-CCNC: 52 U/L
CLARITY UR: CLEAR
CO2 SERPL-SCNC: 21 MMOL/L
COLOR UR: YELLOW
CREAT SERPL-MCNC: 1 MG/DL
DELSYS: ABNORMAL
DIFFERENTIAL METHOD: ABNORMAL
EOSINOPHIL # BLD AUTO: 0 K/UL
EOSINOPHIL NFR BLD: 0 %
ERYTHROCYTE [DISTWIDTH] IN BLOOD BY AUTOMATED COUNT: 13.6 %
EST. GFR  (AFRICAN AMERICAN): >60 ML/MIN/1.73 M^2
EST. GFR  (NON AFRICAN AMERICAN): >60 ML/MIN/1.73 M^2
FIO2: 21
GLUCOSE SERPL-MCNC: 231 MG/DL
GLUCOSE UR QL STRIP: ABNORMAL
HCO3 UR-SCNC: 22.1 MMOL/L (ref 24–28)
HCT VFR BLD AUTO: 46.2 %
HGB BLD-MCNC: 15.8 G/DL
HGB UR QL STRIP: ABNORMAL
INR PPP: 1.4
KETONES UR QL STRIP: ABNORMAL
LEUKOCYTE ESTERASE UR QL STRIP: NEGATIVE
LYMPHOCYTES # BLD AUTO: 0.6 K/UL
LYMPHOCYTES NFR BLD: 5.6 %
MCH RBC QN AUTO: 30.3 PG
MCHC RBC AUTO-ENTMCNC: 34.2 %
MCV RBC AUTO: 89 FL
MODE: ABNORMAL
MONOCYTES # BLD AUTO: 0.7 K/UL
MONOCYTES NFR BLD: 6.6 %
NEUTROPHILS # BLD AUTO: 9.7 K/UL
NEUTROPHILS NFR BLD: 87.5 %
NITRITE UR QL STRIP: NEGATIVE
PCO2 BLDA: 34.9 MMHG (ref 35–45)
PH SMN: 7.41 [PH] (ref 7.35–7.45)
PH UR STRIP: 6 [PH] (ref 5–8)
PLATELET # BLD AUTO: 225 K/UL
PMV BLD AUTO: 9.1 FL
PO2 BLDA: 59 MMHG (ref 80–100)
POC BE: -3 MMOL/L
POC SATURATED O2: 90 % (ref 95–100)
POTASSIUM SERPL-SCNC: 4.2 MMOL/L
PROT SERPL-MCNC: 7.9 G/DL
PROT UR QL STRIP: NEGATIVE
PROTHROMBIN TIME: 14.2 SEC
RBC # BLD AUTO: 5.21 M/UL
SAMPLE: ABNORMAL
SITE: ABNORMAL
SODIUM SERPL-SCNC: 138 MMOL/L
SP GR UR STRIP: 1.01 (ref 1–1.03)
TROPONIN I SERPL DL<=0.01 NG/ML-MCNC: 0.01 NG/ML
URN SPEC COLLECT METH UR: ABNORMAL
UROBILINOGEN UR STRIP-ACNC: 1 EU/DL
WBC # BLD AUTO: 11.11 K/UL

## 2017-03-01 PROCEDURE — 63600175 PHARM REV CODE 636 W HCPCS: Performed by: NURSE PRACTITIONER

## 2017-03-01 PROCEDURE — 21400001 HC TELEMETRY ROOM

## 2017-03-01 PROCEDURE — 80053 COMPREHEN METABOLIC PANEL: CPT

## 2017-03-01 PROCEDURE — 27000221 HC OXYGEN, UP TO 24 HOURS

## 2017-03-01 PROCEDURE — 82550 ASSAY OF CK (CPK): CPT

## 2017-03-01 PROCEDURE — 83880 ASSAY OF NATRIURETIC PEPTIDE: CPT

## 2017-03-01 PROCEDURE — 96375 TX/PRO/DX INJ NEW DRUG ADDON: CPT

## 2017-03-01 PROCEDURE — 81003 URINALYSIS AUTO W/O SCOPE: CPT

## 2017-03-01 PROCEDURE — 63600175 PHARM REV CODE 636 W HCPCS: Performed by: EMERGENCY MEDICINE

## 2017-03-01 PROCEDURE — 94640 AIRWAY INHALATION TREATMENT: CPT

## 2017-03-01 PROCEDURE — 36415 COLL VENOUS BLD VENIPUNCTURE: CPT

## 2017-03-01 PROCEDURE — 96365 THER/PROPH/DIAG IV INF INIT: CPT

## 2017-03-01 PROCEDURE — 87040 BLOOD CULTURE FOR BACTERIA: CPT

## 2017-03-01 PROCEDURE — 93010 ELECTROCARDIOGRAM REPORT: CPT | Mod: ,,, | Performed by: INTERNAL MEDICINE

## 2017-03-01 PROCEDURE — 36600 WITHDRAWAL OF ARTERIAL BLOOD: CPT

## 2017-03-01 PROCEDURE — 82803 BLOOD GASES ANY COMBINATION: CPT

## 2017-03-01 PROCEDURE — 25000003 PHARM REV CODE 250: Performed by: NURSE PRACTITIONER

## 2017-03-01 PROCEDURE — 25500020 PHARM REV CODE 255: Performed by: EMERGENCY MEDICINE

## 2017-03-01 PROCEDURE — 25000242 PHARM REV CODE 250 ALT 637 W/ HCPCS: Performed by: EMERGENCY MEDICINE

## 2017-03-01 PROCEDURE — 85610 PROTHROMBIN TIME: CPT

## 2017-03-01 PROCEDURE — 84484 ASSAY OF TROPONIN QUANT: CPT

## 2017-03-01 PROCEDURE — 99900035 HC TECH TIME PER 15 MIN (STAT)

## 2017-03-01 PROCEDURE — 85610 PROTHROMBIN TIME: CPT | Mod: 91

## 2017-03-01 PROCEDURE — 85730 THROMBOPLASTIN TIME PARTIAL: CPT

## 2017-03-01 PROCEDURE — 99285 EMERGENCY DEPT VISIT HI MDM: CPT | Mod: 25

## 2017-03-01 PROCEDURE — 87400 INFLUENZA A/B EACH AG IA: CPT | Mod: 59

## 2017-03-01 PROCEDURE — 96372 THER/PROPH/DIAG INJ SC/IM: CPT

## 2017-03-01 PROCEDURE — 85025 COMPLETE CBC W/AUTO DIFF WBC: CPT

## 2017-03-01 RX ORDER — IBUPROFEN 200 MG
24 TABLET ORAL
Status: DISCONTINUED | OUTPATIENT
Start: 2017-03-01 | End: 2017-03-05 | Stop reason: HOSPADM

## 2017-03-01 RX ORDER — MOXIFLOXACIN HYDROCHLORIDE 400 MG/250ML
400 INJECTION, SOLUTION INTRAVENOUS
Status: DISCONTINUED | OUTPATIENT
Start: 2017-03-01 | End: 2017-03-02

## 2017-03-01 RX ORDER — ATORVASTATIN CALCIUM 10 MG/1
10 TABLET, FILM COATED ORAL DAILY
Status: DISCONTINUED | OUTPATIENT
Start: 2017-03-02 | End: 2017-03-05 | Stop reason: HOSPADM

## 2017-03-01 RX ORDER — IPRATROPIUM BROMIDE AND ALBUTEROL SULFATE 2.5; .5 MG/3ML; MG/3ML
3 SOLUTION RESPIRATORY (INHALATION) EVERY 4 HOURS PRN
Status: DISCONTINUED | OUTPATIENT
Start: 2017-03-01 | End: 2017-03-05 | Stop reason: HOSPADM

## 2017-03-01 RX ORDER — WARFARIN 7.5 MG/1
7.5 TABLET ORAL
Status: DISCONTINUED | OUTPATIENT
Start: 2017-03-08 | End: 2017-03-01

## 2017-03-01 RX ORDER — ONDANSETRON 2 MG/ML
4 INJECTION INTRAMUSCULAR; INTRAVENOUS
Status: COMPLETED | OUTPATIENT
Start: 2017-03-01 | End: 2017-03-01

## 2017-03-01 RX ORDER — GLUCAGON 1 MG
1 KIT INJECTION
Status: DISCONTINUED | OUTPATIENT
Start: 2017-03-01 | End: 2017-03-05 | Stop reason: HOSPADM

## 2017-03-01 RX ORDER — HYDROCODONE BITARTRATE AND ACETAMINOPHEN 10; 325 MG/1; MG/1
1 TABLET ORAL EVERY 6 HOURS PRN
Status: DISCONTINUED | OUTPATIENT
Start: 2017-03-01 | End: 2017-03-05 | Stop reason: HOSPADM

## 2017-03-01 RX ORDER — FAMOTIDINE 20 MG/1
20 TABLET, FILM COATED ORAL DAILY
Status: DISCONTINUED | OUTPATIENT
Start: 2017-03-02 | End: 2017-03-05 | Stop reason: HOSPADM

## 2017-03-01 RX ORDER — IPRATROPIUM BROMIDE AND ALBUTEROL SULFATE 2.5; .5 MG/3ML; MG/3ML
3 SOLUTION RESPIRATORY (INHALATION)
Status: COMPLETED | OUTPATIENT
Start: 2017-03-01 | End: 2017-03-01

## 2017-03-01 RX ORDER — IPRATROPIUM BROMIDE AND ALBUTEROL SULFATE 2.5; .5 MG/3ML; MG/3ML
3 SOLUTION RESPIRATORY (INHALATION) EVERY 6 HOURS
Status: DISCONTINUED | OUTPATIENT
Start: 2017-03-01 | End: 2017-03-05 | Stop reason: HOSPADM

## 2017-03-01 RX ORDER — IBUPROFEN 200 MG
16 TABLET ORAL
Status: DISCONTINUED | OUTPATIENT
Start: 2017-03-01 | End: 2017-03-05 | Stop reason: HOSPADM

## 2017-03-01 RX ORDER — FELODIPINE 2.5 MG/1
2.5 TABLET, EXTENDED RELEASE ORAL DAILY
Status: DISCONTINUED | OUTPATIENT
Start: 2017-03-02 | End: 2017-03-05 | Stop reason: HOSPADM

## 2017-03-01 RX ORDER — SODIUM CHLORIDE 9 MG/ML
INJECTION, SOLUTION INTRAVENOUS CONTINUOUS
Status: DISCONTINUED | OUTPATIENT
Start: 2017-03-01 | End: 2017-03-04

## 2017-03-01 RX ORDER — INSULIN ASPART 100 [IU]/ML
0-5 INJECTION, SOLUTION INTRAVENOUS; SUBCUTANEOUS
Status: DISCONTINUED | OUTPATIENT
Start: 2017-03-01 | End: 2017-03-05 | Stop reason: HOSPADM

## 2017-03-01 RX ORDER — ACETAMINOPHEN 325 MG/1
650 TABLET ORAL EVERY 6 HOURS PRN
Status: DISCONTINUED | OUTPATIENT
Start: 2017-03-01 | End: 2017-03-05 | Stop reason: HOSPADM

## 2017-03-01 RX ORDER — ENOXAPARIN SODIUM 100 MG/ML
1 INJECTION SUBCUTANEOUS
Status: COMPLETED | OUTPATIENT
Start: 2017-03-01 | End: 2017-03-01

## 2017-03-01 RX ORDER — FUROSEMIDE 10 MG/ML
20 INJECTION INTRAMUSCULAR; INTRAVENOUS ONCE
Status: COMPLETED | OUTPATIENT
Start: 2017-03-01 | End: 2017-03-01

## 2017-03-01 RX ADMIN — ENOXAPARIN SODIUM 90 MG: 100 INJECTION SUBCUTANEOUS at 02:03

## 2017-03-01 RX ADMIN — WARFARIN SODIUM 7.5 MG: 7.5 TABLET ORAL at 07:03

## 2017-03-01 RX ADMIN — FUROSEMIDE 20 MG: 10 INJECTION, SOLUTION INTRAMUSCULAR; INTRAVENOUS at 10:03

## 2017-03-01 RX ADMIN — IOHEXOL 100 ML: 350 INJECTION, SOLUTION INTRAVENOUS at 01:03

## 2017-03-01 RX ADMIN — IPRATROPIUM BROMIDE AND ALBUTEROL SULFATE 3 ML: .5; 3 SOLUTION RESPIRATORY (INHALATION) at 12:03

## 2017-03-01 RX ADMIN — MOXIFLOXACIN HYDROCHLORIDE 400 MG: 400 INJECTION, SOLUTION INTRAVENOUS at 02:03

## 2017-03-01 RX ADMIN — IPRATROPIUM BROMIDE AND ALBUTEROL SULFATE 3 ML: .5; 3 SOLUTION RESPIRATORY (INHALATION) at 08:03

## 2017-03-01 RX ADMIN — ONDANSETRON 4 MG: 2 INJECTION INTRAMUSCULAR; INTRAVENOUS at 03:03

## 2017-03-01 NOTE — IP AVS SNAPSHOT
[facility]  [id number] [place] [address] [address] Keila [place]UBIAS [address]           Patient Discharge Instructions     Our goal is to set you up for success. This packet includes information on your condition, medications, and your home care. It will help you to care for yourself so you don't get sicker and need to go back to the hospital.     Please ask your nurse if you have any questions.        There are many details to remember when preparing to leave the hospital. Here is what you will need to do:    1. Take your medicine. If you are prescribed medications, review your Medication List in the following pages. You may have new medications to  at the pharmacy and others that you'll need to stop taking. Review the instructions for how and when to take your medications. Talk with your doctor or nurses if you are unsure of what to do.     2. Go to your follow-up appointments. Specific follow-up information is listed in the following pages. Your may be contacted by a transition nurse or clinical provider about future appointments. Be sure we have all of the phone numbers to reach you, if needed. Please contact your provider's office if you are unable to make an appointment.     3. Watch for warning signs. Your doctor or nurse will give you detailed warning signs to watch for and when to call for assistance. These instructions may also include educational information about your condition. If you experience any of warning signs to your health, call your doctor.               ** Verify the list of medication(s) below is accurate and up to date. Carry this with you in case of emergency. If your medications have changed, please notify your healthcare provider.             Medication List      START taking these medications        Additional Info                      albuterol 2.5 mg /3 mL (0.083 %) nebulizer solution   Commonly known as:  PROVENTIL   Quantity:  120 each   Refills:  0   Dose:  2.5 mg     Instructions:  Take 3 mLs (2.5 mg total) by nebulization every 6 (six) hours while awake. Rescue     Begin Date    AM    Noon    PM    Bedtime       oseltamivir 75 MG capsule   Commonly known as:  TAMIFLU   Quantity:  10 capsule   Refills:  0   Dose:  75 mg    Last time this was given:  75 mg on 3/5/2017  8:23 AM   Instructions:  Take 1 capsule (75 mg total) by mouth 2 (two) times daily.     Begin Date    AM    Noon    PM    Bedtime         CONTINUE taking these medications        Additional Info                      acetaminophen-codeine 300-30mg 300-30 mg Tab   Commonly known as:  TYLENOL #3   Quantity:  30 tablet   Refills:  0   Dose:  1 tablet    Instructions:  Take 1 tablet by mouth every 8 (eight) hours as needed.     Begin Date    AM    Noon    PM    Bedtime       atorvastatin 10 MG tablet   Commonly known as:  LIPITOR   Quantity:  90 tablet   Refills:  3    Last time this was given:  10 mg on 3/5/2017  8:24 AM   Instructions:  TAKE 1 TABLET EVERY DAY     Begin Date    AM    Noon    PM    Bedtime       felodipine 2.5 MG Tb24   Commonly known as:  PLENDIL   Quantity:  30 tablet   Refills:  11    Last time this was given:  2.5 mg on 3/5/2017  8:23 AM   Instructions:  TAKE 1 TABLET EVERY DAY     Begin Date    AM    Noon    PM    Bedtime       fluticasone 50 mcg/actuation nasal spray   Commonly known as:  FLONASE   Quantity:  1 Bottle   Refills:  0   Dose:  1 spray    Instructions:  1 spray by Each Nare route once daily.     Begin Date    AM    Noon    PM    Bedtime       hydrocodone-acetaminophen 10-325mg  mg Tab   Commonly known as:  NORCO   Quantity:  20 tablet   Refills:  0   Dose:  1 tablet    Instructions:  Take 1 tablet by mouth every 4 (four) hours as needed.     Begin Date    AM    Noon    PM    Bedtime       meclizine 25 mg tablet   Commonly known as:  ANTIVERT   Quantity:  30 tablet   Refills:  0   Dose:  25 mg    Instructions:  Take 1 tablet (25 mg total) by mouth 3 (three) times daily as  needed.     Begin Date    AM    Noon    PM    Bedtime       warfarin 7.5 MG tablet   Commonly known as:  COUMADIN   Quantity:  30 tablet   Refills:  3    Last time this was given:  7.5 mg on 3/4/2017  5:57 PM   Instructions:  Take 1/2 tablet onand Fridays and 1 tablet all remaining days as directed by the coumadin clinic.     Begin Date    AM    Noon    PM    Bedtime            Where to Get Your Medications      These medications were sent to [facility] [address] [address]     Phone:  [phone]     albuterol 2.5 mg /3 mL (0.083 %) nebulizer solution    oseltamivir 75 MG capsule                  Please bring to all follow up appointments:    1. A copy of your discharge instructions.  2. All medicines you are currently taking in their original bottles.  3. Identification and insurance card.    Please arrive 15 minutes ahead of scheduled appointment time.    Please call 24 hours in advance if you must reschedule your appointment and/or time.        Your Scheduled Appointments     Mar 16, 2017 11:00 AM CDT   Coumadin with [facility]in ([facility])    [address] [place] [id number]   [phone]            Sep 27, 2017  8:30 AM CDT   Established Patient Visit with [facility] - Ophthalmology ([facility])    [address] [place] [id number]   [phone]            Dec 21, 2017  2:20 PM CST   Established Patient Visit with Elmer Calderon IV, MD   [facility] Urology ([facility])    [address] [place] [id number]   [phone]              Follow-up Information     Follow up with Carmelita Sanchez MD. Schedule an appointment as soon as possible for a visit in 1 week.    Specialty:  Family Medicine    Why:  and , As needed    Contact information:    [address] [place] [address] [address] [place] [address]  [phone]        Referrals     Future Orders     Ambulatory referral to Home Health     Ambulatory referral to Outpatient Case Management     Questions:    Does the patient have a chronic or uncontrolled disease process?:  Yes    Does the patient have a new diagnosis of a catastrophic or life altering illness/treatment?:  Yes    Does the patient have any psycho-social issues that may affect their ability to adhere to treatment plan?:  Yes    Does patient have any behaviors or circumstances that may impede ability to adhere to treatment plan?:  Yes    Is patient at risk for admission/readmission?:  Yes        Discharge Instructions     Future Orders    Activity as tolerated     Diet general     Questions:    Total calories:      Fat restriction, if any:      Protein restriction, if any:      Na restriction, if any:  2gNa    Fluid restriction:      Additional restrictions:  Diabetic 1800    NEBULIZER FOR HOME USE     Questions:    Height:  6' (1.829 m)    Weight:  90.7 kg (200 lb)    Does patient have medical equipment at home?:  walker, rolling    shower chair    wheelchair    Length of need (1-99 months):  99    OXYGEN FOR HOME USE     Questions:    Liter Flow:  2    Duration:  Continuous    Qualifying SpO2:  88    Testing done at:  Exercise/Activity    Route:  nasal cannula    Portable mode:      Device:  home concentrator with portable unit    Length of need (in months):      Patient condition with qualifying saturation:      Height:  6' (1.829 m)    Weight:  90.7 kg (200 lb)    Does patient have medical equipment at home?:  walker, rolling    shower chair    wheelchair    Alternative treatment measures have been tried or considered and deemed clinically ineffective.:  Yes        Primary Diagnosis     Your primary diagnosis was:  Decreased Oxygen Supply      Admission Information     Date & Time Department CSN    3/1/2017 11:41 AM [facility] -  [id number]      Care Providers     Provider Role Specialty Primary office phone    Roberto Carlos Potter MD Team  Attending  General Practice [phone]      Important Medicare Message          Most Recent Value    Important Message from Medicare Regarding Discharge Appeal Rights  Given to patient/caregiver, Explained to patient/caregiver, Signed/date by patient/caregiver yes 03/03/2017 1125      Your Vitals Were     BP Pulse Temp Resp Height Weight    129/69 (BP Location: Right arm, Patient Position: Lying, BP Method: Automatic) 91 97.8 °F (36.6 °C) (Oral) 20 6' (1.829 m) 90.7 kg (200 lb)    SpO2 BMI             95% 27.12 kg/m2         Recent Lab Values        10/17/2012 1/24/2013 4/17/2013 10/15/2013 11/6/2014 2/19/2015 3/17/2016 9/22/2016     10:17 AM  9:47 AM  8:28 AM  9:00 AM 10:46 AM 11:09 AM 11:50 AM  2:55 PM    A1C 6.9 (H) 6.6 (H) 7.0 (H) 7.0 (H) 7.4 (H) 7.5 (H) 7.7 (H) 8.1 (H)    Comment for A1C at  2:55 PM on 9/22/2016:  According to ADA guidelines, hemoglobin A1C <7.0% represents  optimal control in non-pregnant diabetic patients.  Different  metrics may apply to specific populations.   Standards of Medical Care in Diabetes - 2016.  For the purpose of screening for the presence of diabetes:  <5.7%     Consistent with the absence of diabetes  5.7-6.4%  Consistent with increasing risk for diabetes   (prediabetes)  >or=6.5%  Consistent with diabetes  Currently no consensus exists for use of hemoglobin A1C  for diagnosis of diabetes for children.        Pending Labs     Order Current Status    Blood culture Preliminary result    Blood culture Preliminary result      Allergies as of 3/5/2017        Reactions    No Known Drug Allergies       [facility]     [facility] - 24/7 Assistance  Unless otherwise directed by your provider, please contact [facility], our nurse care line that is available for 24/7 assistance.     Registered nurses in the [facility] provide clinical advisement, health education, appointment booking, and other advisory services.  Call for this free service at  [phone].        Advance Directives     An advance directive is a document which, in the event you are no longer able to make decisions for yourself, tells your healthcare team what kind of treatment you do or do not want to receive, or who you would like to make those decisions for you.  If you do not currently have an advance directive, [facility] encourages you to create one.  For more information call:  [phone]), [phone] ([phone]),  or log on to [email or web address].        Language Assistance Services     ATTENTION: Language assistance services are available, free of charge. Please call [phone].      ATENCIÓN: Si judy lacy, tiene a livingston disposición servicios gratuitos de asistencia lingüística. Llame al [phone].     CHÚ Ý: N?u b?n nói Ti?ng Vi?t, có các d?ch v? h? tr? ngôn ng? mi?n phí dành cho b?n. G?i s? [phone].        Coumadin Discharge Instructions                         Diabetes Discharge Instructions                                   [facility] Sign-Up     Activating your [facility] account is as easy as 1-2-3!     1) Visit [email or web address], select Sign Up Now, enter this activation code and your date of birth, then select Next.  [email or web address]  Expires: 3/12/2017 10:18 AM      2) Create a username and password to use when you visit [facility] in the future and select a security question in case you lose your password and select Next.    3) Enter your e-mail address and click Sign Up!    Additional Information  If you have questions, please e-mail [email or web address] or call [phone] to talk to our [facility] staff. Remember, [facility] is NOT to be used for urgent needs. For medical emergencies, dial 911.          [facility] complies with applicable Federal civil rights laws and does not discriminate on the basis of race, color, national origin, age, disability, or sex.

## 2017-03-01 NOTE — SUBJECTIVE & OBJECTIVE
Past Medical History:   Diagnosis Date    Blood clotting disorder     Cataract     both eyes in [place]    Diabetes mellitus type II 09/22/2016    no medicine today no BS    Elevated PSA     Has had evaluation by Dr. Blackman; Recommended against PSA test and advised annual CHANDAN    Hyperlipidemia     Hypertension     Osteoarthritis        Past Surgical History:   Procedure Laterality Date    COLONOSCOPY      left inguinal hernia repair         Review of patient's allergies indicates:   Allergen Reactions    No known drug allergies        No current facility-administered medications on file prior to encounter.      Current Outpatient Prescriptions on File Prior to Encounter   Medication Sig    acetaminophen-codeine 300-30mg (TYLENOL #3) 300-30 mg Tab Take 1 tablet by mouth every 8 (eight) hours as needed.    atorvastatin (LIPITOR) 10 MG tablet TAKE 1 TABLET EVERY DAY    felodipine (PLENDIL) 2.5 MG Tb24 TAKE 1 TABLET EVERY DAY    hydrocodone-acetaminophen 10-325mg (NORCO)  mg Tab Take 1 tablet by mouth every 4 (four) hours as needed.    warfarin (COUMADIN) 7.5 MG tablet Take 1/2 tablet onand Fridays and 1 tablet all remaining days as directed by the coumadin clinic.    fluticasone (FLONASE) 50 mcg/actuation nasal spray 1 spray by Each Nare route once daily.    meclizine (ANTIVERT) 25 mg tablet Take 1 tablet (25 mg total) by mouth 3 (three) times daily as needed.    [DISCONTINUED] loratadine (CLARITIN) 10 mg tablet Take 1 tablet (10 mg total) by mouth once daily.     Family History     Problem Relation (Age of Onset)    Cancer Father    Stroke Mother        Social History Main Topics    Smoking status: Never Smoker    Smokeless tobacco: Never Used    Alcohol use No    Drug use: No    Sexual activity: Not Currently     Review of Systems   Constitutional: Positive for appetite change and fatigue. Negative for activity change, chills, fever and unexpected weight change.        Generalized  weakness   HENT: Negative for congestion, facial swelling, rhinorrhea, sinus pressure, sneezing and sore throat.    Eyes: Negative for discharge, redness and visual disturbance.   Respiratory: Positive for cough and shortness of breath. Negative for apnea, chest tightness, wheezing and stridor.    Cardiovascular: Negative for chest pain, palpitations and leg swelling.   Gastrointestinal: Positive for nausea and vomiting. Negative for abdominal distention, abdominal pain, anal bleeding, blood in stool, constipation and diarrhea.   Genitourinary: Negative for difficulty urinating, discharge, dysuria, frequency and hematuria.   Musculoskeletal: Negative for arthralgias, back pain, gait problem, joint swelling, myalgias, neck pain and neck stiffness.   Skin: Negative for color change and pallor.   Neurological: Negative for dizziness, tremors, seizures, syncope, facial asymmetry, speech difficulty, weakness, light-headedness, numbness and headaches.   Psychiatric/Behavioral: Negative for behavioral problems, confusion, hallucinations and suicidal ideas. The patient is not nervous/anxious.    All other systems reviewed and are negative.    Objective:     Vital Signs (Most Recent):  Temp: 97.6 °F (36.4 °C) (03/01/17 1139)  Pulse: 83 (03/01/17 1716)  Resp: (!) 32 (03/01/17 1716)  BP: (!) 148/74 (03/01/17 1716)  SpO2: (!) 93 % (03/01/17 1716) Vital Signs (24h Range):  Temp:  [97.6 °F (36.4 °C)] 97.6 °F (36.4 °C)  Pulse:  [] 83  Resp:  [15-32] 32  SpO2:  [86 %-96 %] 93 %  BP: (119-162)/(64-87) 148/74     Weight: 90.7 kg (200 lb)  Body mass index is 27.12 kg/(m^2).    Physical Exam   Constitutional: He is oriented to person, place, and time. He appears well-developed and well-nourished.   Elderly and frail appearing.    HENT:   Head: Normocephalic and atraumatic.   Eyes: Conjunctivae are normal. Pupils are equal, round, and reactive to light.   Neck: Normal range of motion. Neck supple.   Cardiovascular: Normal rate,  regular rhythm, normal heart sounds and intact distal pulses.    No murmur heard.  Pulmonary/Chest: Effort normal and breath sounds normal. No respiratory distress.   BBS coarse with rhonchi    Abdominal: Soft. Bowel sounds are normal. He exhibits no distension. There is no tenderness.   Musculoskeletal: He exhibits no edema, tenderness or deformity.   Neurological: He is alert and oriented to person, place, and time.   Lethargic    Skin: Skin is warm and dry. No rash noted. No erythema.   Psychiatric: He has a normal mood and affect. His behavior is normal.   Nursing note and vitals reviewed.       Significant Labs: All pertinent labs within the past 24 hours have been reviewed.    Significant Imaging:   Imaging Results         CTA Chest Non-Coronary (Final result) Result time:  03/01/17 13:50:39    Final result by Yonatan Singh III, MD (03/01/17 13:50:39)    Impression:      1. Patient respiratory motion causes artifactual filling defects within several bilateral lower lobe pulmonary arteries. No evidence of pulmonary embolism in the remaining pulmonary arteries.    2. Mild dilatation of the proximal descending thoracic aorta measuring up to 3.8 cm.  3.  Chronic subpleural interstitial thickening possibly related to mild chronic interstitial lung disease.  No acute appearing infiltrates identified.            Electronically signed by: YONATAN SINGH MD  Date:     03/01/17  Time:    13:50     Narrative:    CTA CHEST NON CORONARY    Clinical history: Shortness of breath (786.05).  Cough.  Possible pulmonary embolism    Technique: Routine CT angiogram of the chest protocol performed after the IV administration of 100 mL Omnipaque 350. 3D reconstructions/reformats/MIPs obtained. All CT scans at this facility use dose modulation, iterative reconstruction, and/or weight based dosing when appropriate to reduce radiation dose to as low as reasonably achievable.    Comparison: None    Findings: There is patient respiratory  motion artifact causing artifactual filling defects within several bilateral lower lobe pulmonary arteries. There is no evidence of pulmonary embolism in the remaining pulmonary arteries.  There is mild dilatation of the proximal descending thoracic aorta and distal aortic arch measuring up to 3.8 cm in diameter. No aortic dissection is identified.  There is no cardiomegaly or pericardial effusion.  No pathologically enlarged lymph nodes are seen in the chest.  There is chronic subpleural interstitial thickening in both lungs, most prominent in the lung bases.  Bibasilar dependent atelectasis is noted.  The lungs are otherwise clear without evidence of acute airspace consolidation, effusion, pneumothorax or other acute pulmonary disease.  No acute osseous abnormality is seen. Limited images through the upper abdomen demonstrate no acute findings.            X-Ray Chest AP Portable (Final result) Result time:  03/01/17 12:37:50    Final result by Yonatan Singh III, MD (03/01/17 12:37:50)    Impression:     Possible trace left pleural effusion with mild adjacent left basilar discoid atelectasis.  Stable mild chronic interstitial thickening.        Electronically signed by: YONATAN SINGH MD  Date:     03/01/17  Time:    12:37     Narrative:    XR CHEST AP PORTABLE    Clinical history: sob.      Findings: Heart size is within normal limits for AP technique.  Stable aortic atherosclerosis and tortuosity. There is stable mild chronic coarsening of the interstitial markings.  There is mild blunting of the left costophrenic angle suggesting a small pleural effusion. There is mild adjacent discoid atelectasis in the left lung base.  The remainder of the lungs appear clear of active disease.

## 2017-03-01 NOTE — ED NOTES
Family reports pt became sick with nausea and vomiting after administration of avalox. Dr. Zarco notified, new orders to be written.

## 2017-03-01 NOTE — ED NOTES
Neli RT paged to come and evaluate patient. Patient sound rattling in his throat and is coughing with tachypnea.

## 2017-03-01 NOTE — ED PROVIDER NOTES
SCRIBE #1 NOTE: I, Alfred Carter, am scribing for, and in the presence of, Tirpp Zarco MD. I have scribed the entire note.      History      Chief Complaint   Patient presents with    Cough     family reports pt. started couging last night, weakness, and decrease appetite        Review of patient's allergies indicates:   Allergen Reactions    No known drug allergies         HPI   HPI    3/1/2017, 11:48 AM   History obtained from the patient      History of Present Illness: Todd Lee is a 85 y.o. male patient who presents to the Emergency Department for SOB which onset gradually yesterday. Symptoms are constant and moderate in severity. Sx are exacerbated by nothing and relieved by nothing. Associated sxs include productive cough. Patient denies any fever, N/V/D, chills, weakness/numbness, palpitations, leg swelling, CP, chest tightness, dizziness, lightheadedness and all other sxs at this time. No further complaints or concerns at this time.     Arrival mode: Personal vehicle     PCP: Carmelita Sanchez MD       Past Medical History:  Past Medical History:   Diagnosis Date    Blood clotting disorder     Cataract     both eyes in [place]    Diabetes mellitus type II 09/22/2016    no medicine today no BS    Elevated PSA     Has had evaluation by Dr. Blackman; Recommended against PSA test and advised annual CHANDAN    Hyperlipidemia     Hypertension     Osteoarthritis        Past Surgical History:  Past Surgical History:   Procedure Laterality Date    COLONOSCOPY      left inguinal hernia repair           Family History:  Family History   Problem Relation Age of Onset    Stroke Mother     Cancer Father        Social History:  Social History     Social History Main Topics    Smoking status: Never Smoker    Smokeless tobacco: Never Used    Alcohol use No    Drug use: No    Sexual activity: Not Currently       ROS   Review of Systems   Constitutional: Negative for chills and fever.    HENT: Negative for congestion and sore throat.    Respiratory: Positive for cough and shortness of breath. Negative for chest tightness.    Cardiovascular: Negative for chest pain.   Gastrointestinal: Negative for abdominal pain, nausea and vomiting.   Genitourinary: Negative for difficulty urinating and dysuria.   Musculoskeletal: Negative for back pain and neck pain.   Skin: Negative for rash.   Neurological: Negative for dizziness, weakness, numbness and headaches.   Psychiatric/Behavioral: Negative for agitation and confusion.   All other systems reviewed and are negative.      Physical Exam    Initial Vitals   BP Pulse Resp Temp SpO2   03/01/17 1139 03/01/17 1139 03/01/17 1139 03/01/17 1139 03/01/17 1139   151/87 86 18 97.6 °F (36.4 °C) 86 %      Physical Exam  Nursing Notes and Vital Signs Reviewed.  Constitutional: Patient is in mild distress. Awake and alert. Well-developed and well-nourished.  Head: Atraumatic. Normocephalic.  Eyes: PERRL. EOM intact. Conjunctivae are not pale. No scleral icterus.  ENT: Mucous membranes are moist. Oropharynx is clear and symmetric.    Neck: Supple. Full ROM. No lymphadenopathy.  Cardiovascular: Regular rate. Regular rhythm. No murmurs, rubs, or gallops. Distal pulses are 2+ and symmetric.  Pulmonary/Chest: Mild respiratory distress. Diffuse wheezing noted bilaterally. No rales or rhonchi appreciated.  Abdominal: Soft and non-distended.  There is no tenderness. No rebound, guarding, or rigidity. Good bowel sounds.  Musculoskeletal: Moves all extremities. No obvious deformities. No edema. No calf tenderness.  Skin: Warm and dry.  Neurological:  Alert, awake, and appropriate.  Normal speech.  No acute focal neurological deficits are appreciated.  Psychiatric: Normal affect. Good eye contact. Appropriate in content.    ED Course    Procedures  ED Vital Signs:  Vitals:    03/01/17 1139 03/01/17 1200 03/01/17 1207 03/01/17 1246   BP: (!) 151/87 (!) 154/79  (!) 150/70   Pulse:  86 82 80 85   Resp: 18 15 (!) 22 (!) 24   Temp: 97.6 °F (36.4 °C)      TempSrc: Oral      SpO2: (!) 86% 95% 95%    Weight: 90.7 kg (200 lb)      Height: 6' (1.829 m)       03/01/17 1250 03/01/17 1252 03/01/17 1254 03/01/17 1343   BP: 136/76 (!) 141/82 119/64 138/66   Pulse: 92 95 104 89   Resp:    (!) 27   Temp:       TempSrc:       SpO2: 96% 95% (!) 91% 95%   Weight:       Height:           Abnormal Lab Results:  Labs Reviewed   CBC W/ AUTO DIFFERENTIAL - Abnormal; Notable for the following:        Result Value    MPV 9.1 (*)     Gran # 9.7 (*)     Lymph # 0.6 (*)     Gran% 87.5 (*)     Lymph% 5.6 (*)     All other components within normal limits   COMPREHENSIVE METABOLIC PANEL - Abnormal; Notable for the following:     CO2 21 (*)     Glucose 231 (*)     Total Bilirubin 1.2 (*)     All other components within normal limits   URINALYSIS - Abnormal; Notable for the following:     Glucose, UA 1+ (*)     Ketones, UA 1+ (*)     Occult Blood UA Trace (*)     All other components within normal limits   PROTIME-INR - Abnormal; Notable for the following:     Prothrombin Time 14.2 (*)     INR 1.4 (*)     All other components within normal limits   ISTAT PROCEDURE - Abnormal; Notable for the following:     POC PCO2 34.9 (*)     POC PO2 59 (*)     POC HCO3 22.1 (*)     POC SATURATED O2 90 (*)     All other components within normal limits   CULTURE, BLOOD   CULTURE, BLOOD   B-TYPE NATRIURETIC PEPTIDE   CK   TROPONIN I   APTT   PROTIME-INR   APTT        All Lab Results:  Results for orders placed or performed during the hospital encounter of 03/01/17   CBC auto differential   Result Value Ref Range    WBC 11.11 3.90 - 12.70 K/uL    RBC 5.21 4.60 - 6.20 M/uL    Hemoglobin 15.8 14.0 - 18.0 g/dL    Hematocrit 46.2 40.0 - 54.0 %    MCV 89 82 - 98 fL    MCH 30.3 27.0 - 31.0 pg    MCHC 34.2 32.0 - 36.0 %    RDW 13.6 11.5 - 14.5 %    Platelets 225 150 - 350 K/uL    MPV 9.1 (L) 9.2 - 12.9 fL    Gran # 9.7 (H) 1.8 - 7.7 K/uL    Lymph #  0.6 (L) 1.0 - 4.8 K/uL    Mono # 0.7 0.3 - 1.0 K/uL    Eos # 0.0 0.0 - 0.5 K/uL    Baso # 0.03 0.00 - 0.20 K/uL    Gran% 87.5 (H) 38.0 - 73.0 %    Lymph% 5.6 (L) 18.0 - 48.0 %    Mono% 6.6 4.0 - 15.0 %    Eosinophil% 0.0 0.0 - 8.0 %    Basophil% 0.3 0.0 - 1.9 %    Differential Method Automated    Comprehensive metabolic panel   Result Value Ref Range    Sodium 138 136 - 145 mmol/L    Potassium 4.2 3.5 - 5.1 mmol/L    Chloride 105 95 - 110 mmol/L    CO2 21 (L) 23 - 29 mmol/L    Glucose 231 (H) 70 - 110 mg/dL    BUN, Bld 14 8 - 23 mg/dL    Creatinine 1.0 0.5 - 1.4 mg/dL    Calcium 9.3 8.7 - 10.5 mg/dL    Total Protein 7.9 6.0 - 8.4 g/dL    Albumin 3.7 3.5 - 5.2 g/dL    Total Bilirubin 1.2 (H) 0.1 - 1.0 mg/dL    Alkaline Phosphatase 120 55 - 135 U/L    AST 21 10 - 40 U/L    ALT 28 10 - 44 U/L    Anion Gap 12 8 - 16 mmol/L    eGFR if African American >60 >60 mL/min/1.73 m^2    eGFR if non African American >60 >60 mL/min/1.73 m^2   Urinalysis   Result Value Ref Range    Specimen UA Urine, Clean Catch     Color, UA Yellow Yellow, Straw, Madai    Appearance, UA Clear Clear    pH, UA 6.0 5.0 - 8.0    Specific Gravity, UA 1.010 1.005 - 1.030    Protein, UA Negative Negative    Glucose, UA 1+ (A) Negative    Ketones, UA 1+ (A) Negative    Bilirubin (UA) Negative Negative    Occult Blood UA Trace (A) Negative    Nitrite, UA Negative Negative    Urobilinogen, UA 1.0 <2.0 EU/dL    Leukocytes, UA Negative Negative   Brain natriuretic peptide   Result Value Ref Range    BNP 71 0 - 99 pg/mL   CK   Result Value Ref Range    CPK 52 20 - 200 U/L   Troponin I   Result Value Ref Range    Troponin I 0.013 0.000 - 0.026 ng/mL   APTT   Result Value Ref Range    aPTT 28.5 21.0 - 32.0 sec   Protime-INR   Result Value Ref Range    Prothrombin Time 14.2 (H) 9.0 - 12.5 sec    INR 1.4 (H) 0.8 - 1.2   ISTAT PROCEDURE   Result Value Ref Range    POC PH 7.410 7.35 - 7.45    POC PCO2 34.9 (L) 35 - 45 mmHg    POC PO2 59 (LL) 80 - 100 mmHg    POC HCO3  22.1 (L) 24 - 28 mmol/L    POC BE -3 -2 to 2 mmol/L    POC SATURATED O2 90 (L) 95 - 100 %    Sample ARTERIAL     Site LR     Allens Test Pass     DelSys Room Air     Mode SPONT     FiO2 21          Imaging Results:  Imaging Results         CTA Chest Non-Coronary (Final result) Result time:  03/01/17 13:50:39    Final result by Yonatan Singh III, MD (03/01/17 13:50:39)    Impression:      1. Patient respiratory motion causes artifactual filling defects within several bilateral lower lobe pulmonary arteries. No evidence of pulmonary embolism in the remaining pulmonary arteries.    2. Mild dilatation of the proximal descending thoracic aorta measuring up to 3.8 cm.  3.  Chronic subpleural interstitial thickening possibly related to mild chronic interstitial lung disease.  No acute appearing infiltrates identified.            Electronically signed by: YONATAN SINGH MD  Date:     03/01/17  Time:    13:50     Narrative:    CTA CHEST NON CORONARY    Clinical history: Shortness of breath (786.05).  Cough.  Possible pulmonary embolism    Technique: Routine CT angiogram of the chest protocol performed after the IV administration of 100 mL Omnipaque 350. 3D reconstructions/reformats/MIPs obtained. All CT scans at this facility use dose modulation, iterative reconstruction, and/or weight based dosing when appropriate to reduce radiation dose to as low as reasonably achievable.    Comparison: None    Findings: There is patient respiratory motion artifact causing artifactual filling defects within several bilateral lower lobe pulmonary arteries. There is no evidence of pulmonary embolism in the remaining pulmonary arteries.  There is mild dilatation of the proximal descending thoracic aorta and distal aortic arch measuring up to 3.8 cm in diameter. No aortic dissection is identified.  There is no cardiomegaly or pericardial effusion.  No pathologically enlarged lymph nodes are seen in the chest.  There is chronic subpleural  interstitial thickening in both lungs, most prominent in the lung bases.  Bibasilar dependent atelectasis is noted.  The lungs are otherwise clear without evidence of acute airspace consolidation, effusion, pneumothorax or other acute pulmonary disease.  No acute osseous abnormality is seen. Limited images through the upper abdomen demonstrate no acute findings.            X-Ray Chest AP Portable (Final result) Result time:  03/01/17 12:37:50    Final result by Yonatan Singh III, MD (03/01/17 12:37:50)    Impression:     Possible trace left pleural effusion with mild adjacent left basilar discoid atelectasis.  Stable mild chronic interstitial thickening.        Electronically signed by: YONATAN SINGH MD  Date:     03/01/17  Time:    12:37     Narrative:    XR CHEST AP PORTABLE    Clinical history: sob.      Findings: Heart size is within normal limits for AP technique.  Stable aortic atherosclerosis and tortuosity. There is stable mild chronic coarsening of the interstitial markings.  There is mild blunting of the left costophrenic angle suggesting a small pleural effusion. There is mild adjacent discoid atelectasis in the left lung base.  The remainder of the lungs appear clear of active disease.               The EKG was ordered, reviewed, and independently interpreted by the ED provider.  Interpretation time: 1155  Rate: 83 BPM  Rhythm: Sinus rhythm with occasional and consecutive premature ventricular complexes  Interpretation: No STEMI.           The Emergency Provider reviewed the vital signs and test results, which are outlined above.    ED Discussion     1:42 PM: Re-evaluated pt. I have discussed test results, shared treatment plan, and the need for admission with patient and family at bedside. Pt and family express understanding at this time and agree with all information. All questions answered. Pt and family have no further questions or concerns at this time. Pt is ready for admit.    1:59 PM: Discussed  case with Annalise ([facility]). Dr. Sue agrees with current care and management of pt and accepts admission.   Admitting Service: Hospital medicine   Admitting Physician: Dr. Sue  Admit to: Tele      ED Medication(s):  Medications   moxifloxacin 400 mg/250 mL IVPB 400 mg (0 mg Intravenous Hold 3/1/17 1428)   albuterol-ipratropium 2.5mg-0.5mg/3mL nebulizer solution 3 mL (3 mLs Nebulization Given 3/1/17 1207)   omnipaque 350 iohexol 100 mL (100 mLs Intravenous Given 3/1/17 1334)   enoxaparin injection 90 mg (90 mg Subcutaneous Given 3/1/17 1425)       New Prescriptions    No medications on file             Medical Decision Making    Medical Decision Making:   Clinical Tests:   Lab Tests: Reviewed and Ordered  Radiological Study: Ordered and Reviewed  Medical Tests: Reviewed and Ordered           Scribe Attestation:   Scribe #1: I performed the above scribed service and the documentation accurately describes the services I performed. I attest to the accuracy of the note.    Attending:   Physician Attestation Statement for Scribe #1: I, Tripp Zarco MD, personally performed the services described in this documentation, as scribed by Alfred Carter, in my presence, and it is both accurate and complete.          Clinical Impression       ICD-10-CM ICD-9-CM   1. Hypoxia R09.02 799.02   2. SOB (shortness of breath) R06.02 786.05   3. Cough R05 786.2       Disposition:   Disposition: Admitted (Tele)  Condition: Fair         Tripp Zarco MD  03/01/17 5216

## 2017-03-01 NOTE — ED NOTES
Patient lying in bed, lights dimmed for comfort, no acute distress noted, awake, alert, and oriented x 3, calm, respirations even and unlabored, and skin is warm and dry. Patient verbalized understanding of status and plan of care. Patient denies needs at this time. Side rails up x 2, call light in reach, bed low and locked. Family at bedside. Pt remains on cardiac monitor , bp cuff, pulse ox. Will continue to monitor.

## 2017-03-01 NOTE — ED NOTES
Patient soiled bed linens, bedding changed, pt assisted back in bed, family at bedside. Pt remains fatigued and dyspnic on exertion. Will continue to monitor.

## 2017-03-01 NOTE — ED NOTES
Patient's bedding and gown was changed with patient lying in bed. Pt repositioned, adjusted for comfort. Patient and family denies needs at this time. Will continue to monitor.

## 2017-03-01 NOTE — H&P
"[facility] Hospital Medicine  History & Physical    Patient Name: Todd Lee  MRN: [id number]  Admission Date: 3/1/2017  Attending Physician: No att. providers found   Primary Care Provider: Carmelita Sanchez MD         Patient information was obtained from patient, relative(s) and ER records.     Subjective:     Principal Problem:Hypoxia    Chief Complaint:   Chief Complaint   Patient presents with    Cough     family reports pt. started couging last night, weakness, and decrease appetite         HPI: Todd Lee is a 85 y.o. Male with a PMH of DM, HTN, HLD, clotting disorder who was brought in by family for generalized weakness which began several days ago. Associated symptoms include a productive cough with yellow sputum, nausea, vomiting and decreased PO intake. The family reports that the patient recently suffered a fall at home resulting in two compression fractures and reports that the patient "has not been the same since".  The family at bedside report that the patient has not been eating or drinking "much" in the past few days. The patient denies any modifying factors. Patient denies fever, chills, CP, cough, mcclellan, pnd, orthopnea, palpitations, diaphoresis, headache, blurred vision, numbness, tingling, dizziness, localized weakness, abdominal pain, blood in stools, melena, hematemesis, urinary frequency, urgency, dysuria or hematuria. Family reports that the patient currently lives at home alone. CTA was negative for PE.    Past Medical History:   Diagnosis Date    Blood clotting disorder     Cataract     both eyes in [place]    Diabetes mellitus type II 09/22/2016    no medicine today no BS    Elevated PSA     Has had evaluation by Dr. Blackman; Recommended against PSA test and advised annual CHANDAN    Hyperlipidemia     Hypertension     Osteoarthritis        Past Surgical History:   Procedure Laterality Date    COLONOSCOPY      left inguinal hernia repair   "       Review of patient's allergies indicates:   Allergen Reactions    No known drug allergies        No current facility-administered medications on file prior to encounter.      Current Outpatient Prescriptions on File Prior to Encounter   Medication Sig    acetaminophen-codeine 300-30mg (TYLENOL #3) 300-30 mg Tab Take 1 tablet by mouth every 8 (eight) hours as needed.    atorvastatin (LIPITOR) 10 MG tablet TAKE 1 TABLET EVERY DAY    felodipine (PLENDIL) 2.5 MG Tb24 TAKE 1 TABLET EVERY DAY    hydrocodone-acetaminophen 10-325mg (NORCO)  mg Tab Take 1 tablet by mouth every 4 (four) hours as needed.    warfarin (COUMADIN) 7.5 MG tablet Take 1/2 tablet onand Fridays and 1 tablet all remaining days as directed by the coumadin clinic.    fluticasone (FLONASE) 50 mcg/actuation nasal spray 1 spray by Each Nare route once daily.    meclizine (ANTIVERT) 25 mg tablet Take 1 tablet (25 mg total) by mouth 3 (three) times daily as needed.    [DISCONTINUED] loratadine (CLARITIN) 10 mg tablet Take 1 tablet (10 mg total) by mouth once daily.     Family History     Problem Relation (Age of Onset)    Cancer Father    Stroke Mother        Social History Main Topics    Smoking status: Never Smoker    Smokeless tobacco: Never Used    Alcohol use No    Drug use: No    Sexual activity: Not Currently     Review of Systems   Constitutional: Positive for appetite change and fatigue. Negative for activity change, chills, fever and unexpected weight change.        Generalized weakness   HENT: Negative for congestion, facial swelling, rhinorrhea, sinus pressure, sneezing and sore throat.    Eyes: Negative for discharge, redness and visual disturbance.   Respiratory: Positive for cough and shortness of breath. Negative for apnea, chest tightness, wheezing and stridor.    Cardiovascular: Negative for chest pain, palpitations and leg swelling.   Gastrointestinal: Positive for nausea and vomiting. Negative for abdominal  distention, abdominal pain, anal bleeding, blood in stool, constipation and diarrhea.   Genitourinary: Negative for difficulty urinating, discharge, dysuria, frequency and hematuria.   Musculoskeletal: Negative for arthralgias, back pain, gait problem, joint swelling, myalgias, neck pain and neck stiffness.   Skin: Negative for color change and pallor.   Neurological: Negative for dizziness, tremors, seizures, syncope, facial asymmetry, speech difficulty, weakness, light-headedness, numbness and headaches.   Psychiatric/Behavioral: Negative for behavioral problems, confusion, hallucinations and suicidal ideas. The patient is not nervous/anxious.    All other systems reviewed and are negative.    Objective:     Vital Signs (Most Recent):  Temp: 97.6 °F (36.4 °C) (03/01/17 1139)  Pulse: 83 (03/01/17 1716)  Resp: (!) 32 (03/01/17 1716)  BP: (!) 148/74 (03/01/17 1716)  SpO2: (!) 93 % (03/01/17 1716) Vital Signs (24h Range):  Temp:  [97.6 °F (36.4 °C)] 97.6 °F (36.4 °C)  Pulse:  [] 83  Resp:  [15-32] 32  SpO2:  [86 %-96 %] 93 %  BP: (119-162)/(64-87) 148/74     Weight: 90.7 kg (200 lb)  Body mass index is 27.12 kg/(m^2).    Physical Exam   Constitutional: He is oriented to person, place, and time. He appears well-developed and well-nourished.   Elderly and frail appearing.    HENT:   Head: Normocephalic and atraumatic.   Eyes: Conjunctivae are normal. Pupils are equal, round, and reactive to light.   Neck: Normal range of motion. Neck supple.   Cardiovascular: Normal rate, regular rhythm, normal heart sounds and intact distal pulses.    No murmur heard.  Pulmonary/Chest: Effort normal and breath sounds normal. No respiratory distress.   BBS coarse with rhonchi    Abdominal: Soft. Bowel sounds are normal. He exhibits no distension. There is no tenderness.   Musculoskeletal: He exhibits no edema, tenderness or deformity.   Neurological: He is alert and oriented to person, place, and time.   Lethargic    Skin: Skin is  warm and dry. No rash noted. No erythema.   Psychiatric: He has a normal mood and affect. His behavior is normal.   Nursing note and vitals reviewed.       Significant Labs: All pertinent labs within the past 24 hours have been reviewed.    Significant Imaging:   Imaging Results         CTA Chest Non-Coronary (Final result) Result time:  03/01/17 13:50:39    Final result by Yonatan Singh III, MD (03/01/17 13:50:39)    Impression:      1. Patient respiratory motion causes artifactual filling defects within several bilateral lower lobe pulmonary arteries. No evidence of pulmonary embolism in the remaining pulmonary arteries.    2. Mild dilatation of the proximal descending thoracic aorta measuring up to 3.8 cm.  3.  Chronic subpleural interstitial thickening possibly related to mild chronic interstitial lung disease.  No acute appearing infiltrates identified.            Electronically signed by: YONATAN SINGH MD  Date:     03/01/17  Time:    13:50     Narrative:    CTA CHEST NON CORONARY    Clinical history: Shortness of breath (786.05).  Cough.  Possible pulmonary embolism    Technique: Routine CT angiogram of the chest protocol performed after the IV administration of 100 mL Omnipaque 350. 3D reconstructions/reformats/MIPs obtained. All CT scans at this facility use dose modulation, iterative reconstruction, and/or weight based dosing when appropriate to reduce radiation dose to as low as reasonably achievable.    Comparison: None    Findings: There is patient respiratory motion artifact causing artifactual filling defects within several bilateral lower lobe pulmonary arteries. There is no evidence of pulmonary embolism in the remaining pulmonary arteries.  There is mild dilatation of the proximal descending thoracic aorta and distal aortic arch measuring up to 3.8 cm in diameter. No aortic dissection is identified.  There is no cardiomegaly or pericardial effusion.  No pathologically enlarged lymph nodes are seen  in the chest.  There is chronic subpleural interstitial thickening in both lungs, most prominent in the lung bases.  Bibasilar dependent atelectasis is noted.  The lungs are otherwise clear without evidence of acute airspace consolidation, effusion, pneumothorax or other acute pulmonary disease.  No acute osseous abnormality is seen. Limited images through the upper abdomen demonstrate no acute findings.            X-Ray Chest AP Portable (Final result) Result time:  03/01/17 12:37:50    Final result by Yonatan Singh III, MD (03/01/17 12:37:50)    Impression:     Possible trace left pleural effusion with mild adjacent left basilar discoid atelectasis.  Stable mild chronic interstitial thickening.        Electronically signed by: YONATAN SINGH MD  Date:     03/01/17  Time:    12:37     Narrative:    XR CHEST AP PORTABLE    Clinical history: sob.      Findings: Heart size is within normal limits for AP technique.  Stable aortic atherosclerosis and tortuosity. There is stable mild chronic coarsening of the interstitial markings.  There is mild blunting of the left costophrenic angle suggesting a small pleural effusion. There is mild adjacent discoid atelectasis in the left lung base.  The remainder of the lungs appear clear of active disease.                 Assessment/Plan:     * Hypoxia  - Supplemental O2  - pulse ox  - neb tx        Type 2 diabetes mellitus without complication, without long-term current use of insulin  - SSI  - diabetic diet      Hyperlipidemia  - resume statin       Hypertension  - monitor VS   - resume home meds       Generalized weakness  - PT/OT eval and treat  - fall precautions       VTE Risk Mitigation     None        Tony Villanueva NP  Department of Hospital Medicine   [facility]

## 2017-03-02 PROBLEM — J10.1 INFLUENZA A: Status: ACTIVE | Noted: 2017-03-02

## 2017-03-02 PROBLEM — Z86.711 HISTORY OF PULMONARY EMBOLISM: Status: ACTIVE | Noted: 2017-03-02

## 2017-03-02 LAB
ALBUMIN SERPL BCP-MCNC: 3.3 G/DL
ALP SERPL-CCNC: 98 U/L
ALT SERPL W/O P-5'-P-CCNC: 23 U/L
ANION GAP SERPL CALC-SCNC: 10 MMOL/L
AST SERPL-CCNC: 18 U/L
BASOPHILS # BLD AUTO: 0.01 K/UL
BASOPHILS NFR BLD: 0.1 %
BILIRUB SERPL-MCNC: 0.7 MG/DL
BUN SERPL-MCNC: 16 MG/DL
CALCIUM SERPL-MCNC: 8.6 MG/DL
CHLORIDE SERPL-SCNC: 103 MMOL/L
CO2 SERPL-SCNC: 26 MMOL/L
CREAT SERPL-MCNC: 1.1 MG/DL
DIASTOLIC DYSFUNCTION: NO
DIFFERENTIAL METHOD: ABNORMAL
EOSINOPHIL # BLD AUTO: 0 K/UL
EOSINOPHIL NFR BLD: 0 %
ERYTHROCYTE [DISTWIDTH] IN BLOOD BY AUTOMATED COUNT: 13.9 %
EST. GFR  (AFRICAN AMERICAN): >60 ML/MIN/1.73 M^2
EST. GFR  (NON AFRICAN AMERICAN): >60 ML/MIN/1.73 M^2
ESTIMATED PA SYSTOLIC PRESSURE: 18.32
FLUAV AG SPEC QL IA: POSITIVE
FLUBV AG SPEC QL IA: NEGATIVE
GLUCOSE SERPL-MCNC: 227 MG/DL
HCT VFR BLD AUTO: 43.9 %
HGB BLD-MCNC: 14.4 G/DL
INR PPP: 1.4
INR PPP: 1.4
LYMPHOCYTES # BLD AUTO: 1.1 K/UL
LYMPHOCYTES NFR BLD: 9.1 %
MAGNESIUM SERPL-MCNC: 2 MG/DL
MCH RBC QN AUTO: 29.7 PG
MCHC RBC AUTO-ENTMCNC: 32.8 %
MCV RBC AUTO: 91 FL
MONOCYTES # BLD AUTO: 1 K/UL
MONOCYTES NFR BLD: 8.3 %
NEUTROPHILS # BLD AUTO: 10.3 K/UL
NEUTROPHILS NFR BLD: 82.5 %
PHOSPHATE SERPL-MCNC: 3.4 MG/DL
PLATELET # BLD AUTO: 231 K/UL
PMV BLD AUTO: 9.4 FL
POCT GLUCOSE: 153 MG/DL (ref 70–110)
POCT GLUCOSE: 237 MG/DL (ref 70–110)
POTASSIUM SERPL-SCNC: 3.7 MMOL/L
PROT SERPL-MCNC: 7.1 G/DL
PROTHROMBIN TIME: 14.3 SEC
PROTHROMBIN TIME: 14.5 SEC
RBC # BLD AUTO: 4.85 M/UL
RETIRED EF AND QEF - SEE NOTES: 55 (ref 55–65)
SODIUM SERPL-SCNC: 139 MMOL/L
SPECIMEN SOURCE: ABNORMAL
WBC # BLD AUTO: 12.53 K/UL

## 2017-03-02 PROCEDURE — G8988 SELF CARE GOAL STATUS: HCPCS | Mod: CL

## 2017-03-02 PROCEDURE — 27000221 HC OXYGEN, UP TO 24 HOURS

## 2017-03-02 PROCEDURE — 83735 ASSAY OF MAGNESIUM: CPT

## 2017-03-02 PROCEDURE — 85610 PROTHROMBIN TIME: CPT

## 2017-03-02 PROCEDURE — 97530 THERAPEUTIC ACTIVITIES: CPT

## 2017-03-02 PROCEDURE — G8979 MOBILITY GOAL STATUS: HCPCS | Mod: CI

## 2017-03-02 PROCEDURE — G8987 SELF CARE CURRENT STATUS: HCPCS | Mod: CM

## 2017-03-02 PROCEDURE — 63600175 PHARM REV CODE 636 W HCPCS: Performed by: NURSE PRACTITIONER

## 2017-03-02 PROCEDURE — G8989 SELF CARE D/C STATUS: HCPCS | Mod: CL

## 2017-03-02 PROCEDURE — 93306 TTE W/DOPPLER COMPLETE: CPT | Mod: 26,,, | Performed by: INTERNAL MEDICINE

## 2017-03-02 PROCEDURE — 97165 OT EVAL LOW COMPLEX 30 MIN: CPT

## 2017-03-02 PROCEDURE — 94799 UNLISTED PULMONARY SVC/PX: CPT

## 2017-03-02 PROCEDURE — 93306 TTE W/DOPPLER COMPLETE: CPT

## 2017-03-02 PROCEDURE — 21400001 HC TELEMETRY ROOM

## 2017-03-02 PROCEDURE — 97162 PT EVAL MOD COMPLEX 30 MIN: CPT

## 2017-03-02 PROCEDURE — 80053 COMPREHEN METABOLIC PANEL: CPT

## 2017-03-02 PROCEDURE — 94640 AIRWAY INHALATION TREATMENT: CPT

## 2017-03-02 PROCEDURE — 97116 GAIT TRAINING THERAPY: CPT

## 2017-03-02 PROCEDURE — 63600175 PHARM REV CODE 636 W HCPCS: Performed by: EMERGENCY MEDICINE

## 2017-03-02 PROCEDURE — G8978 MOBILITY CURRENT STATUS: HCPCS | Mod: CK

## 2017-03-02 PROCEDURE — 25000242 PHARM REV CODE 250 ALT 637 W/ HCPCS: Performed by: EMERGENCY MEDICINE

## 2017-03-02 PROCEDURE — 25000003 PHARM REV CODE 250: Performed by: NURSE PRACTITIONER

## 2017-03-02 PROCEDURE — 84100 ASSAY OF PHOSPHORUS: CPT

## 2017-03-02 PROCEDURE — 25000003 PHARM REV CODE 250: Performed by: INTERNAL MEDICINE

## 2017-03-02 PROCEDURE — 85025 COMPLETE CBC W/AUTO DIFF WBC: CPT

## 2017-03-02 PROCEDURE — 97802 MEDICAL NUTRITION INDIV IN: CPT

## 2017-03-02 RX ORDER — OSELTAMIVIR PHOSPHATE 75 MG/1
75 CAPSULE ORAL 2 TIMES DAILY
Status: DISCONTINUED | OUTPATIENT
Start: 2017-03-02 | End: 2017-03-05 | Stop reason: HOSPADM

## 2017-03-02 RX ADMIN — OSELTAMIVIR PHOSPHATE 75 MG: 75 CAPSULE ORAL at 08:03

## 2017-03-02 RX ADMIN — WARFARIN SODIUM 7.5 MG: 2.5 TABLET ORAL at 05:03

## 2017-03-02 RX ADMIN — IPRATROPIUM BROMIDE AND ALBUTEROL SULFATE 3 ML: .5; 3 SOLUTION RESPIRATORY (INHALATION) at 12:03

## 2017-03-02 RX ADMIN — IPRATROPIUM BROMIDE AND ALBUTEROL SULFATE 3 ML: .5; 3 SOLUTION RESPIRATORY (INHALATION) at 02:03

## 2017-03-02 RX ADMIN — FAMOTIDINE 20 MG: 20 TABLET ORAL at 08:03

## 2017-03-02 RX ADMIN — ATORVASTATIN CALCIUM 10 MG: 10 TABLET, FILM COATED ORAL at 08:03

## 2017-03-02 RX ADMIN — OSELTAMIVIR PHOSPHATE 75 MG: 75 CAPSULE ORAL at 01:03

## 2017-03-02 RX ADMIN — IPRATROPIUM BROMIDE AND ALBUTEROL SULFATE 3 ML: .5; 3 SOLUTION RESPIRATORY (INHALATION) at 09:03

## 2017-03-02 RX ADMIN — INSULIN ASPART 3 UNITS: 100 INJECTION, SOLUTION INTRAVENOUS; SUBCUTANEOUS at 11:03

## 2017-03-02 RX ADMIN — FELODIPINE 2.5 MG: 2.5 TABLET, EXTENDED RELEASE ORAL at 08:03

## 2017-03-02 RX ADMIN — IPRATROPIUM BROMIDE AND ALBUTEROL SULFATE 3 ML: .5; 3 SOLUTION RESPIRATORY (INHALATION) at 08:03

## 2017-03-02 RX ADMIN — SODIUM CHLORIDE: 0.9 INJECTION, SOLUTION INTRAVENOUS at 01:03

## 2017-03-02 RX ADMIN — MOXIFLOXACIN HYDROCHLORIDE 400 MG: 400 INJECTION, SOLUTION INTRAVENOUS at 02:03

## 2017-03-02 NOTE — ED NOTES
Pt resting quietly in stretcher with HOB elevated, SR up X2. Family at bedside. Pt tray at bedside, did not eat anything on tray.  BBS with posterior crackles with fine exp wheezing noted throughout. 93% Sat. Denies c/o pain. IV patent to LFA and LAC without redness, swelling or edema.  Cardiac monitor with NSR noted. Awaiting bed or admit.

## 2017-03-02 NOTE — CONSULTS
Provided written materials and verbal instruction of Coumadin/Vitamin K Drug Nutrient interaction to daughter and patient.

## 2017-03-02 NOTE — PT/OT/SLP EVAL
Occupational Therapy  Evaluation    Todd Lee   MRN: [id number]   Admitting Diagnosis: Hypoxia    OT Date of Treatment: 17   OT Start Time: 1450  OT Stop Time: 1515  OT Total Time (min): 25 min    Billable Minutes:  Evaluation  x 15 min  Therapeutic Activity  x 10 min    Diagnosis: Hypoxia   O.T. tx dx:debility and impaired self care skills     Past Medical History:   Diagnosis Date    Blood clotting disorder     Cataract     both eyes in [place]    Diabetes mellitus type II 2016    no medicine today no BS    Elevated PSA     Has had evaluation by Dr. Blackman; Recommended against PSA test and advised annual CHANDAN    Hyperlipidemia     Hypertension     Osteoarthritis       Past Surgical History:   Procedure Laterality Date    COLONOSCOPY      left inguinal hernia repair         Referring physician:   Date referred to OT:     General Precautions: Standard, fall, respiratory, droplet  Orthopedic Precautions: N/A  Braces: N/A          Patient History:  Living Environment  Lives With: alone  Living Arrangements: house  Home Layout: Able to live on 1st floor  Transportation Available: family or friend will provide  Living Environment Comment: PT LIVES ALONE, DAUGHTER CHECKS IN DAILY, H/O OF SEVERAL FALLS IN THE LAST FEW MONTHS CAUSING COMPRESSION FX'S AND PELVIC FX.   Equipment Currently Used at Home: walker, rolling, shower chair, wheelchair    Prior level of function:   Bed Mobility/Transfers: independent  Grooming: independent  Bathing: independent  Upper Body Dressing: independent  Lower Body Dressing: independent  Toileting: independent        Dominant hand: right    Subjective:  Communicated with pt, daughter, and nurse - Abril prior to session.    Chief Complaint: none  Patient/Family stated goals: to return home    Pain Ratin/10                   Objective:  Patient found with: telemetry, peripheral IV, oxygen    Cognitive Exam:  Oriented to: Person and Place  Follows  Commands/attention: Follows one-step commands  Communication: clear/fluent  Memory:  No Deficits noted  Safety awareness/insight to disability: impaired  Coping skills/emotional control: Appropriate to situation    Visual/perceptual:  Intact    Physical Exam:  Postural examination/scapula alignment: Rounded shoulder and Head forward  Skin integrity: Visible skin intact  Edema: None noted BUE    Sensation:   Intact    Upper Extremity Range of Motion:  Right Upper Extremity: WFL  Left Upper Extremity: WFL    Upper Extremity Strength:  Right Upper Extremity: grossly 4-/5  Left Upper Extremity: grossly 4-/5   Strength: WFL    Fine motor coordination:   Intact    Gross motor coordination: WFL    Functional Mobility:  Bed Mobility:  Rolling/Turning to Left: Stand by assistance  Rolling/Turning Right: Stand by assistance  Scooting/Bridging: Contact Guard Assistance  Supine to Sit: Stand by Assistance  Sit to Supine: Stand by Assistance    Transfers:  Sit <> Stand Assistance: Minimum Assistance  Sit <> Stand Assistive Device: No Assistive Device  Chair <>Mat Technique: Stand Pivot  Chair <> Mat Assistance: Minimum Assistance  Chair<> Mat Assistive Device: No Assistive Device    Functional Ambulation:     Activities of Daily Living:     Feeding adaptive equipment:   UE Dressing Level of Assistance: Moderate assistance  UE adaptive equipment:   LE Dressing Level of Assistance: Maximum assistance  LE adaptive equipment:         Toileting Where Assessed: Toilet  Toileting Level of Assistance: Moderate assistance (PER DAUGHTER REPORT)        Bathing adaptive equipment:     Balance:   Static Sit: FAIR+: Able to take MINIMAL challenges from all directions  Dynamic Sit: FAIR: Cannot move trunk without losing balance  Static Stand: POOR+: Needs MINIMAL assist to maintain  Dynamic stand: POOR: N/A    Therapeutic Activities and Exercises:      AM-PAC 6 CLICK ADL  How much help from another person does this patient currently  "need?  1 = Unable, Total/Dependent Assistance  2 = A lot, Maximum/Moderate Assistance  3 = A little, Minimum/Contact Guard/Supervision  4 = None, Modified Delia/Independent         AM-PAC Raw Score CMS "G-Code Modifier Level of Impairment Assistance   6 % Total / Unable   7 - 9 CM 80 - 100% Maximal Assist   10 - 14 CL 60 - 80% Moderate Assist   15 - 19 CK 40 - 60% Moderate Assist   20 - 22 CJ 20 - 40% Minimal Assist   23 CI 1-20% SBA / CGA   24 CH 0% Independent/ Mod I       Patient left supine with all lines intact, call button in reach and daughter present    Assessment:  Todd Lee is a 85 y.o. male with a medical diagnosis of Hypoxia and presents with impaired self care skills and fx mobility. Pt would benefit skilled OT to max indep and to address impairments.     Rehab identified problem list/impairments: Rehab identified problem list/impairments: weakness, impaired endurance, impaired self care skills, impaired functional mobilty, gait instability, impaired balance, impaired cognition, decreased safety awareness, decreased coordination    Rehab potential is good.    Activity tolerance: Good    Discharge recommendations: Discharge Facility/Level Of Care Needs: nursing facility, skilled     Barriers to discharge: Barriers to Discharge: Decreased caregiver support    Equipment recommendations: none     GOALS:   Occupational Therapy Goals        Problem: Occupational Therapy Goal    Goal Priority Disciplines Outcome Interventions   Occupational Therapy Goal     OT, PT/OT     Description:  Goals to be met by: 3/9/17     Patient will increase functional independence with ADLs by performing:    UE Dressing with Minimal Assistance.  LE Dressing with Moderate Assistance.  Toileting from toilet with Contact Guard Assistance for hygiene and clothing management.   Toilet transfer to toilet with Stand-by Assistance.  Upper extremity exercise program x10 reps per handout, with supervision for " minimal resistive ex to BUE.                PLAN:  Patient to be seen 3 x/week to address the above listed problems via self-care/home management, therapeutic exercises, therapeutic activities  Plan of Care expires: 03/09/17  Plan of Care reviewed with: patient, daughter    OT G-codes  Functional Assessment Tool Used: fim-adl  Score: 2  Functional Limitation: Self care  Self Care Current Status (): CM  Self Care Goal Status (): CL  Self Care Discharge Status (): PHILL Rico OT  03/02/2017

## 2017-03-02 NOTE — PLAN OF CARE
Problem: ARDS (Acute Resp Distress Syndrome) (Adult)  Goal: Signs and Symptoms of Listed Potential Problems Will be Absent, Minimized or Managed (ARDS)  Signs and symptoms of listed potential problems will be absent, minimized or managed by discharge/transition of care (reference ARDS (Acute Resp Distress Syndrome) (Adult) CPG).   Outcome: Ongoing (interventions implemented as appropriate)  o2 sat on nc 4l/m=94%; tolerates txs well; encouraged the use of incentive spirometry.

## 2017-03-02 NOTE — PROGRESS NOTES
Pharmacy Brief Progress Note: Coumadin Counseling     Patient's daughter educated on warfarin indication, side effects, and drug interactions. Discussed importance of medication compliance and INR monitoring and reviewed signs of abnormal bleeding. Patient's daughter given warfarin educational handout.    Patient's daughter expressed understanding and had questions regarding falls on warfarin. Pharmacy contacted Aletha , who was aware of fall risk and patient mobility issues and is working on that.     Patient's daughter asked about vitamin K content of salads including iceberg lettuce.  Pharmacy explained the handout's listing of the vitamin K content of certain foods and how vitamin k acts as an antidote for warfarin.     Thank you for allowing us to participate in this patient's care.     Karena Garcia 3/2/2017 11:32 AM

## 2017-03-02 NOTE — PLAN OF CARE
Problem: Patient Care Overview  Goal: Plan of Care Review  Outcome: Ongoing (interventions implemented as appropriate)  Recommendation/Intervention: 1. Continue current diet 2. Add Boost Glucose Control 1 can daily (Chocolate)  Goals: Intake of greater than 50% of meals and supplements by next RD visit  Nutrition Goal Status: new

## 2017-03-02 NOTE — PLAN OF CARE
Problem: Patient Care Overview  Goal: Plan of Care Review  Outcome: Ongoing (interventions implemented as appropriate)  Pt appears drowsy, eyes open to voice and pt follows commands. NC increased to 4L, O2 sat >92% per order, elevated HOB, educated pt and family on IS - 100 ml noted. Pt positive for Flu A - new orders for droplet and Tamiflu initiated. 1X dose 20g Lasix- urinal at bedside, shortness of breath noted with activity. Turn Q2 with wedge, pt remained free of falls during shift, no skin breakdown noted at this time, bed alarm set, call light in reach, room free of clutter, side rails up X2, pt on telemetry monitor SR 80-90's, will continue to monitor.

## 2017-03-02 NOTE — PLAN OF CARE
Problem: Patient Care Overview  Goal: Plan of Care Review  Outcome: Ongoing (interventions implemented as appropriate)  Pt appears drowsy, eyes open to voice and pt follows commands. NC increased to 4L and family on IS - 100 ml noted. Pt positive for Flu A. Shortness of breath noted with activity. Pt walk to bathroom X2 assist. PIV intact IV fluids infusing and IV ABX given.Turn Q2 with wedge, pt remained free of falls during shift, no skin breakdown noted at this time, bed alarm set, call light in reach, room free of clutter, side rails up X2, pt on telemetry monitor SR, will continue to monitor, hourly rounding made.

## 2017-03-02 NOTE — NURSING
Patient assessed for diabetes educational needs following chart review  He is groggy but arouses easily  He reports has a nieghbor who assists his with taking his medications consistently  He does not have a home glucose meter at this time  He eats out most meals  Briefly reviewed diabetes care  No further action at this time

## 2017-03-02 NOTE — PLAN OF CARE
CM spoke to rebecaMillicent, to complete assessment.  Millicent would like to resume [facility] with SN, PT, OT, and aide.  She would also like for coumadin levels be drawn at home.  She states that her father lives at home alone.  She drives him to medical appointments and assists with other needs.  CM provided contact information for any needs/questions.     03/02/17 1132   Discharge Assessment   Assessment Type Discharge Planning Assessment   Confirmed/corrected address and phone number on facesheet? Yes   Assessment information obtained from? Caregiver;Medical Record   Expected Length of Stay (days) (TBD)   Communicated expected length of stay with patient/caregiver yes   Prior to hospitilization cognitive status: Unable to Assess   Prior to hospitalization functional status: Assistive Equipment   Current cognitive status: Unable to Assess   Current Functional Status: Assistive Equipment   Arrived From admitted as an inpatient;home health   Lives With alone   Able to Return to Prior Arrangements yes   Is patient able to care for self after discharge? Yes   How many people do you have in your home that can help with your care after discharge? 0   Who are your caregiver(s) and their phone number(s)? Millicent Newton, daughter [phone]   Patient's perception of discharge disposition admitted as an inpatient;home health   Readmission Within The Last 30 Days no previous admission in last 30 days   Patient currently being followed by outpatient case management? No   Patient currently receives home health services? Yes   Patient previously received home health services and would like to resume services if necessary? Yes   If yes, name of home health provider: [facility]   Does the patient currently use HME? Yes   Patient currently receives private duty nursing? No   Patient currently receives any other outside agency services? No   Equipment Currently Used at Home bedside commode;wheelchair   Do you have  any problems affording any of your prescribed medications? No   Is the patient taking medications as prescribed? yes   Do you have any financial concerns preventing you from receiving the healthcare you need? No   Does the patient have transportation to healthcare appointments? Yes   Transportation Available family or friend will provide   On Dialysis? No   Does the patient receive services at the [facility]madin Clinic? No   Are there any open cases? No   Discharge Plan A Home;Home Health   Discharge Plan B Home;Home Health   Patient/Family In Agreement With Plan yes

## 2017-03-02 NOTE — SUBJECTIVE & OBJECTIVE
Interval History: Pt continues to have shortness of breath.  He has not been eating or drinking well since symptoms started, but has been feeling better since being admitted.    Review of Systems   Constitutional: Negative for activity change, appetite change and fatigue.   Respiratory: Positive for shortness of breath. Negative for cough and chest tightness.    Cardiovascular: Negative for chest pain.   Gastrointestinal: Negative for abdominal pain, nausea and vomiting.   Skin: Negative for rash.   Neurological: Negative for dizziness, weakness, light-headedness and headaches.   Psychiatric/Behavioral: Negative for agitation and confusion.     Objective:     Vital Signs (Most Recent):  Temp: 97.8 °F (36.6 °C) (03/02/17 1627)  Pulse: 72 (03/02/17 1627)  Resp: 18 (03/02/17 1627)  BP: 110/64 (03/02/17 1627)  SpO2: (!) 92 % (03/02/17 1627) Vital Signs (24h Range):  Temp:  [97.6 °F (36.4 °C)-98.3 °F (36.8 °C)] 97.8 °F (36.6 °C)  Pulse:  [66-90] 72  Resp:  [17-32] 18  SpO2:  [90 %-96 %] 92 %  BP: (110-158)/(63-82) 110/64     Weight: 90.7 kg (200 lb)  Body mass index is 27.12 kg/(m^2).    Intake/Output Summary (Last 24 hours) at 03/02/17 1756  Last data filed at 03/02/17 0417   Gross per 24 hour   Intake           128.33 ml   Output              350 ml   Net          -221.67 ml      Physical Exam   Constitutional: He is oriented to person, place, and time. He appears well-developed and well-nourished.   HENT:   Head: Normocephalic and atraumatic.   Eyes: EOM are normal. Pupils are equal, round, and reactive to light.   Neck: Normal range of motion. Neck supple.   Cardiovascular: Normal rate, regular rhythm, normal heart sounds and intact distal pulses.  Exam reveals no gallop and no friction rub.    No murmur heard.  Pulmonary/Chest: Effort normal and breath sounds normal.   Abdominal: Soft.   Musculoskeletal: Normal range of motion.   Neurological: He is alert and oriented to person, place, and time.   Skin: Skin is warm  and dry.   Psychiatric: He has a normal mood and affect. His behavior is normal.   Nursing note and vitals reviewed.      Significant Labs:   BMP:   Recent Labs  Lab 03/02/17  0427   *      K 3.7      CO2 26   BUN 16   CREATININE 1.1   CALCIUM 8.6*   MG 2.0     CBC:   Recent Labs  Lab 03/01/17  1200 03/02/17  0427   WBC 11.11 12.53   HGB 15.8 14.4   HCT 46.2 43.9    231       Significant Imaging: I have reviewed all pertinent imaging results/findings within the past 24 hours.

## 2017-03-02 NOTE — PT/OT/SLP EVAL
Physical Therapy  Evaluation    Todd Lee   MRN: [id number]   Admitting Diagnosis: Hypoxia    PT Received On: 17  PT Start Time: 09     PT Stop Time: 925    PT Total Time (min): 25 min       Billable Minutes:  Evaluation 15 and Gait [id number]    Diagnosis: Hypoxia  P.T. TX DX: IMPAIRED FUNCTIONAL MOBILITY    Past Medical History:   Diagnosis Date    Blood clotting disorder     Cataract     both eyes in [place]    Diabetes mellitus type II 2016    no medicine today no BS    Elevated PSA     Has had evaluation by Dr. Blackman; Recommended against PSA test and advised annual CHANDAN    Hyperlipidemia     Hypertension     Osteoarthritis       Past Surgical History:   Procedure Laterality Date    COLONOSCOPY      left inguinal hernia repair       Referring physician: DR. JETER  Date referred to PT: 3-1-17    General Precautions: Standard, fall, respiratory, droplet (+FLU A) HARD OF HEARING  Orthopedic Precautions: N/A   Braces: N/A       Patient History:  Lives With: alone  Living Arrangements: house  Home Layout: Able to live on 1st floor  Transportation Available: family or friend will provide (FRIEND DRIVES FOR PT)  Living Environment Comment: PT LIVES ALONE, DAUGHTER CHECKS IN DAILY, DAUGHTER REPORTS PT HAVING SEVERAL FALLS IN THE LAST FEW MONTHS CAUSING COMPRESSION FX'S AND PELVIC FX.  Equipment Currently Used at Home: walker, rolling, shower chair, wheelchair  DME owned (not currently used): none    Previous Level of Function:  Ambulation Skills: needs device (AMB WITH RW HOUSEHOLD DISTANCES)  Transfer Skills: independent  ADL Skills: needs device and assist (PT HAS A FRIEND THAT ASSISTS HIM WITH BATHING)    Subjective:  Communicated with NURSE PARKER prior to session.  PT AGREEABLE TO P.T. YAMIL THIS AM, DAUGHTER PRESENT AND EDUCATED IN IMPORTANCE OF WEARING MASK DUE TO DROPLET PRECAUTIONS  Pain Ratin/10 (PT REPORTS NO PAIN BUT GUARDED DURING GAIT)     Objective:   Patient found  with: telemetry, peripheral IV, oxygen     Cognitive Exam:  Oriented to: Person, Place, Time and Situation    Follows Commands/attention: Follows one-step commands  Communication: clear/fluent  Safety awareness/insight to disability: impaired    Physical Exam:  Postural examination/scapula alignment: Rounded shoulder    Skin integrity: Visible skin intact  Edema: None noted     Sensation:   Intact    Upper Extremity Range of Motion:  REFER TO O.T. EVAL    Lower Extremity Range of Motion:  Right Lower Extremity: WFL  Left Lower Extremity: WFL    Lower Extremity Strength:  Right Lower Extremity: GROSSLY 4/5  Left Lower Extremity: GROSSLY 4/5    Functional Mobility:  Bed Mobility:  Rolling/Turning to Left: Minimum assistance  Rolling/Turning Right: Minimum assistance  Scooting/Bridging: Minimum Assistance  Supine to Sit: Minimum Assistance    Transfers:  Sit <> Stand Assistance: Minimum Assistance  Sit <> Stand Assistive Device: Rolling Walker  Bed <> Chair Technique: Stand Pivot  Bed <> Chair Assistance: Minimum Assistance  Bed <> Chair Assistive Device: Rolling Walker    Gait:   Gait Distance: PT AMB 20' IN ROOM WITH RW AND JORGE LUIS, VERBAL AND TACTILE CUES FOR UPRIGHT POSTURE DUE TO POSTERIOR LEAN, PT APPEARS GUARDED BUT WOULD NOT C/O PAIN  Assistance 1: Minimum assistance  Gait Assistive Device: Rolling walker  Gait Pattern: swing-through gait  Gait Deviation(s): decreased martita, decreased step length, decreased toe-to-floor clearance, GAIT IN ROOM ONLY DUE TO DROPLET PRECAUTIONS    Balance:   Static Sit: POOR+ DUE TO POSTERIOR LEAN  Dynamic Sit: POOR, VERBAL AND TACTILE CUES FOR UPRIGHT POSTURE, POSTERIOR LEAN INCREASED WITH DYNAMIC ACTIVITY  Static Stand:   Dynamic stand:     Therapeutic Activities and Exercises:  PT SLOW MOVING WITH FUNCTIONAL MOBILITY, PT EDUCATED IN BLE THEREX TO PERFORM WHILE SEATED IN CHAIR    Patient left up in chair with all lines intact, call button in reach, NURSE notified and DAUGHTER  present.    Assessment:   Todd Lee is a 85 y.o. male with a medical diagnosis of Hypoxia and presents with IMPAIRED FUNCTIONAL MOBILITY. PT WILL BENEFIT FROM CONT. SKILLED P.T. TO ADDRESS IMPAIRMENTS    Rehab identified problem list/impairments: Rehab identified problem list/impairments: weakness, impaired endurance, impaired functional mobilty, gait instability, impaired balance, decreased safety awareness, impaired coordination    Rehab potential is good.    Activity tolerance: Good    Discharge recommendations: Discharge Facility/Level Of Care Needs: nursing facility, skilled     Barriers to discharge: Barriers to Discharge: Decreased caregiver support    Equipment recommendations: Equipment Needed After Discharge: none     GOALS:   Physical Therapy Goals        Problem: Physical Therapy Goal    Goal Priority Disciplines Outcome Goal Variances Interventions   Physical Therapy Goal     PT/OT, PT      Description:  LTG'S TO BE MET IN 7 DAYS (3-9-17)  1. PT WILL REQUIRE SBA FOR BED MOBILITY  2. PT WILL REQUIRE SBA FOR TF'S USING RW  3. PT WILL ' WITH RW AND CGA  4. PT WILL TOLERATE BLE THEREX X 20 REPS AROM  5. PT WILL DEMO F DYNAMIC BALANCE DURING GAIT            PLAN:    Patient to be seen  (A MINIMUM OF 5 OF 7 DAYS A WEEK AS TOLERATED) to address the above listed problems via gait training, therapeutic activities, therapeutic exercises  Plan of Care expires: 03/09/17  Plan of Care reviewed with: patient, daughter     PT ENCOURAGED TO CALL FOR ASSISTANCE WITH ALL NEEDS DUE TO FALL RISK STATUS, PT AGREEABLE.  PT ENCOURAGED TO INCREASE TIME OOB IN CHAIR, ALL MEALS IN CHAIR OOB, PT AGREEABLE    Functional Assessment Tool Used: FIM-GAIT  Score: 4  Functional Limitation: Mobility: Walking and moving around  Mobility: Walking and Moving Around Current Status (): CK  Mobility: Walking and Moving Around Goal Status (): ADE Sandoval, PT  03/02/2017

## 2017-03-02 NOTE — CONSULTS
[facility] -   Adult Nutrition  Consult Note    SUMMARY     Recommendations  Recommendation/Intervention: 1. Continue current diet    2. Add Boost Glucose Control 1 can daily (Chocolate)  Goals: Intake of greater than 50% of meals and supplements by next RD visit  Nutrition Goal Status: new    Nutrition Discharge Plan  Home on Diabetic Cardiac Diet    Reason for Assessment  Reason for Assessment: nurse/nurse practitioner consult (poor intake)  Diagnosis:  (Hypoxia, Influenza)  Relevent Medical History: DM, HLD, HTN, OA   General Information Comments: Daughter at [facility] reports patient's appetite and intake have been decreased only with this current illness. Patient states his appetite is improving. They deny any associated weight loss.    Nutrition Prescription Ordered  Current Diet Order: 1800 Diabetic Diet     Nutrition Risk Screen  Nutrition Risk Screen: reduced oral intake over the last month    Nutrition/Diet History  Typical Food/Fluid Intake: Good appetite and intake until onset of current symptoms     Labs/Tests/Procedures/Meds  Pertinent Labs Reviewed: reviewed  Pertinent Labs Comments: Gluc 227, Alb 3.3  Pertinent Medications Reviewed: reviewed    Anthropometrics  Height (inches): 72.01 in  Weight Method: Stated  Weight (kg): 90.7 kg  Ideal Body Weight (IBW), Male: 178.06 lb  % Ideal Body Weight, Male (lb): 112.3 lb  BMI (kg/m2): 27.11  BMI Grade: 25 - 29.9 - overweight    Estimated/Assessed Needs  Weight Used For Calorie Calculations: 90.7 kg (199 lb 15.3 oz)   Height (cm): 182.9 cm  Energy Need Method: [place]-St Jeor (x1.2 = 1963)  Weight Used For Protein Calculations: 90.7 kg (199 lb 15.3 oz)  0.8 gm Protein (gm): 72.71 and 1.0 gm Protein (gm): 90.89  Fluid Need Method: RDA Method (1ml/kenneth or as needed)    Monitor and Evaluation  Food and Nutrient Intake: food and beverage intake  Biochemical Data, Medical Tests and Procedures: electrolyte and renal panel, glucose/endocrine  profile    Nutrition Risk  Level of Risk:  (1x weekly)    Nutrition Follow-Up  RD Follow-up?: Yes    Assessment and Plan  Generalized weakness  Nutrition Problem:   Other: Decreased oral food and beverage intake    Etiology/Related to:   Decreased appetite    As evidenced by:  Poor intake for 2 days prior to admission    Treatment Strategy:   1. Encourage adequate oral intake  2. Add Boost Glucose control 1 can daily (chocolate)    Nutrition Diagnosis Status:   New

## 2017-03-02 NOTE — CONSULTS
Pharmacy Warfarin Consult Note    INR=1.4  Hx of PE  Goal INR=2-3  Pharmacy is consulted to dose  I have ordered a daily INR.  Patient needs to be educated.    Daily dose according to last Anti-coag visit: Warfarin 3.75mg on Friday and 7.5mg on all other days.    Patient is also taking Avelox which may increase INR.    Thank you for allowing us to participate in this patient's care.    Mirian Sparks, Pharm D 3/1/2017 7:04 PM

## 2017-03-03 ENCOUNTER — OUTPATIENT CASE MANAGEMENT (OUTPATIENT)
Dept: ADMINISTRATIVE | Facility: OTHER | Age: 82
End: 2017-03-03

## 2017-03-03 LAB
ANION GAP SERPL CALC-SCNC: 8 MMOL/L
BASOPHILS # BLD AUTO: 0.03 K/UL
BASOPHILS NFR BLD: 0.4 %
BUN SERPL-MCNC: 20 MG/DL
CALCIUM SERPL-MCNC: 8.4 MG/DL
CHLORIDE SERPL-SCNC: 107 MMOL/L
CO2 SERPL-SCNC: 27 MMOL/L
CREAT SERPL-MCNC: 1 MG/DL
DIFFERENTIAL METHOD: ABNORMAL
EOSINOPHIL # BLD AUTO: 0 K/UL
EOSINOPHIL NFR BLD: 0.4 %
ERYTHROCYTE [DISTWIDTH] IN BLOOD BY AUTOMATED COUNT: 14.1 %
EST. GFR  (AFRICAN AMERICAN): >60 ML/MIN/1.73 M^2
EST. GFR  (NON AFRICAN AMERICAN): >60 ML/MIN/1.73 M^2
GLUCOSE SERPL-MCNC: 140 MG/DL
HCT VFR BLD AUTO: 42.9 %
HGB BLD-MCNC: 13.8 G/DL
INR PPP: 1.7
LYMPHOCYTES # BLD AUTO: 1.3 K/UL
LYMPHOCYTES NFR BLD: 19.2 %
MCH RBC QN AUTO: 29.5 PG
MCHC RBC AUTO-ENTMCNC: 32.2 %
MCV RBC AUTO: 92 FL
MONOCYTES # BLD AUTO: 0.6 K/UL
MONOCYTES NFR BLD: 9.1 %
NEUTROPHILS # BLD AUTO: 4.8 K/UL
NEUTROPHILS NFR BLD: 70.9 %
PLATELET # BLD AUTO: 219 K/UL
PMV BLD AUTO: 9.6 FL
POCT GLUCOSE: 138 MG/DL (ref 70–110)
POCT GLUCOSE: 141 MG/DL (ref 70–110)
POCT GLUCOSE: 165 MG/DL (ref 70–110)
POCT GLUCOSE: 200 MG/DL (ref 70–110)
POCT GLUCOSE: 216 MG/DL (ref 70–110)
POTASSIUM SERPL-SCNC: 3.8 MMOL/L
PROTHROMBIN TIME: 17.6 SEC
RBC # BLD AUTO: 4.68 M/UL
SODIUM SERPL-SCNC: 142 MMOL/L
WBC # BLD AUTO: 6.71 K/UL

## 2017-03-03 PROCEDURE — 25000242 PHARM REV CODE 250 ALT 637 W/ HCPCS: Performed by: EMERGENCY MEDICINE

## 2017-03-03 PROCEDURE — 94799 UNLISTED PULMONARY SVC/PX: CPT

## 2017-03-03 PROCEDURE — 80048 BASIC METABOLIC PNL TOTAL CA: CPT

## 2017-03-03 PROCEDURE — 36415 COLL VENOUS BLD VENIPUNCTURE: CPT

## 2017-03-03 PROCEDURE — 85025 COMPLETE CBC W/AUTO DIFF WBC: CPT

## 2017-03-03 PROCEDURE — 25000003 PHARM REV CODE 250: Performed by: INTERNAL MEDICINE

## 2017-03-03 PROCEDURE — 97110 THERAPEUTIC EXERCISES: CPT

## 2017-03-03 PROCEDURE — 21400001 HC TELEMETRY ROOM

## 2017-03-03 PROCEDURE — 97116 GAIT TRAINING THERAPY: CPT

## 2017-03-03 PROCEDURE — 94640 AIRWAY INHALATION TREATMENT: CPT

## 2017-03-03 PROCEDURE — 97530 THERAPEUTIC ACTIVITIES: CPT

## 2017-03-03 PROCEDURE — 25000003 PHARM REV CODE 250: Performed by: NURSE PRACTITIONER

## 2017-03-03 PROCEDURE — 85610 PROTHROMBIN TIME: CPT

## 2017-03-03 RX ADMIN — FELODIPINE 2.5 MG: 2.5 TABLET, EXTENDED RELEASE ORAL at 08:03

## 2017-03-03 RX ADMIN — WARFARIN SODIUM 3.75 MG: 2.5 TABLET ORAL at 05:03

## 2017-03-03 RX ADMIN — OSELTAMIVIR PHOSPHATE 75 MG: 75 CAPSULE ORAL at 08:03

## 2017-03-03 RX ADMIN — IPRATROPIUM BROMIDE AND ALBUTEROL SULFATE 3 ML: .5; 3 SOLUTION RESPIRATORY (INHALATION) at 12:03

## 2017-03-03 RX ADMIN — IPRATROPIUM BROMIDE AND ALBUTEROL SULFATE 3 ML: .5; 3 SOLUTION RESPIRATORY (INHALATION) at 08:03

## 2017-03-03 RX ADMIN — FAMOTIDINE 20 MG: 20 TABLET ORAL at 08:03

## 2017-03-03 RX ADMIN — IPRATROPIUM BROMIDE AND ALBUTEROL SULFATE 3 ML: .5; 3 SOLUTION RESPIRATORY (INHALATION) at 05:03

## 2017-03-03 RX ADMIN — IPRATROPIUM BROMIDE AND ALBUTEROL SULFATE 3 ML: .5; 3 SOLUTION RESPIRATORY (INHALATION) at 01:03

## 2017-03-03 RX ADMIN — INSULIN ASPART 2 UNITS: 100 INJECTION, SOLUTION INTRAVENOUS; SUBCUTANEOUS at 11:03

## 2017-03-03 RX ADMIN — ATORVASTATIN CALCIUM 10 MG: 10 TABLET, FILM COATED ORAL at 08:03

## 2017-03-03 NOTE — PLAN OF CARE
CM received call from daughterMillicent.  Patient would like to pursue SNF placement.  Preference is for [facility].  Choice for completed with witness.    CM contacted [facility] and spoke to Isaura @[phone].  Referral made via [facility] to [phone].  Per Isaura, they would have to review medical records to determine if patient can be accepted.  If patient is accepted, transfer could potentially be on Monday.    @1500 [place]et called to Babita @ [phone]  @[phone] Level I faxed to GIOVANI @[phone]

## 2017-03-03 NOTE — PT/OT/SLP PROGRESS
Physical Therapy  Treatment    Todd Lee   MRN: [id number]   Admitting Diagnosis: Hypoxia    PT Received On: 17  PT Start Time: 09     PT Stop Time: 925    PT Total Time (min): 25 min       Billable Minutes:  Gait [id number] and Therapeutic Activity 15    Treatment Type: Treatment  PT/PTA: PT       General Precautions: Standard, fall, respiratory, droplet  Orthopedic Precautions: N/A     Subjective:  Communicated with NURSE KEITH prior to session.  PT WITH INCREASED CONFUSION TODAY, CONSTANTLY REPEATING THE SAME PHRASES OVER AND OVER, NOTIFIED NURSE  Pain Ratin/10    Objective:   Patient found with: telemetry, oxygen, peripheral IV    Functional Mobility:  Bed Mobility:   Rolling/Turning to Left: Contact guard assistance  Rolling/Turning Right: Contact guard assistance  Scooting/Bridging: Contact Guard Assistance  Supine to Sit: Minimum Assistance  Sit to Supine: Minimum Assistance    Transfers:  Sit <> Stand Assistance: Minimum Assistance  Sit <> Stand Assistive Device: Rolling Walker    Gait:   Gait Distance: PT AMB 20' IN ROOM WITH RW AND JORGE LUIS, CUES FOR UPRIGHT POSTURE DUE TO SLIGHT POSTERIOR LEAN, NO LOB OR SOB WITH O2 IN TOW  Assistance 1: Minimum assistance  Gait Assistive Device: Rolling walker  Gait Pattern: swing-through gait  Gait Deviation(s): decreased martita, decreased step length    Balance:   Static Sit: FAIR  Dynamic Sit: FAIR  Static Stand: POOR+  Dynamic stand:  POOR+    Therapeutic Activities and Exercises:  PT ASSISTED BACK TO BED AFTER GAIT, HEAD OF BED ELEVATED, PT SET UP FOR BREAKFAST    Patient left supine with all lines intact, call button in reach, bed alarm on and NURSE notified.    Assessment:  Todd Lee is a 85 y.o. male with a medical diagnosis of Hypoxia   PT WILL BENEFIT FROM CONT. SKILLED P.T. TO ADDRESS IMPAIRMENTS    Rehab identified problem list/impairments: Rehab identified problem list/impairments: weakness, impaired endurance, impaired  functional mobilty, gait instability, impaired balance, decreased safety awareness, impaired coordination, impaired cognition    Rehab potential is good.    Activity tolerance: Good    Discharge recommendations: Discharge Facility/Level Of Care Needs: nursing facility, skilled     Barriers to discharge: Barriers to Discharge: Decreased caregiver support    Equipment recommendations: Equipment Needed After Discharge: none     GOALS:   Physical Therapy Goals        Problem: Physical Therapy Goal    Goal Priority Disciplines Outcome Goal Variances Interventions   Physical Therapy Goal     PT/OT, PT      Description:  LTG'S TO BE MET IN 7 DAYS (3-9-17)  1. PT WILL REQUIRE SBA FOR BED MOBILITY  2. PT WILL REQUIRE SBA FOR TF'S USING RW  3. PT WILL ' WITH RW AND CGA  4. PT WILL TOLERATE BLE THEREX X 20 REPS AROM  5. PT WILL DEMO F DYNAMIC BALANCE DURING GAIT            PLAN:    Patient to be seen  (A MINIMUM OF 5 OF 7 DAYS A WEEK AS TOLERATED)  to address the above listed problems via  (CONT PER POC)  Plan of Care expires: 03/09/17  Plan of Care reviewed with: patient    PT ENCOURAGED TO CALL FOR ASSISTANCE WITH ALL NEEDS DUE TO FALL RISK STATUS, PT AGREEABLE.    Maddi Sandoval, PT  03/03/2017

## 2017-03-03 NOTE — PROGRESS NOTES
Please note the following patient has been assigned to  Key Fink RN CCM with [facility] for screening.    Please contact [facility] at ext [phone] with questions.    Thank you    Sofie Puente, SSC

## 2017-03-03 NOTE — PLAN OF CARE
CM received consult for SNF placement.  CM spoke to daughter, Millicent, in regards to SNF placement.  She states that her father has a long term policy that would cover assistance in the home, however the policy requires that her father be in a facility for 24-48 hours.  She states that the stay can not be in a hospital, that it would have to be a SNF (or something similar).  CM educated on SNF placement and that facilities do not usually accept patients for only 24-48 hours.  Millicent states that she is going to speak with her father about placement.  She is unsure if her father will agree to SNF placement.  Millicent has number to contact CM after speaking to patient.

## 2017-03-03 NOTE — PT/OT/SLP PROGRESS
Occupational Therapy  Treatment    Todd Lee   MRN: [id number]   Admitting Diagnosis: Hypoxia    OT Date of Treatment: 17   OT Start Time: 1320  OT Stop Time: 1343  OT Total Time (min): 23 min    Billable Minutes:  Therapeutic Activity  x 10 min and Therapeutic Exercise  x 13 min    General Precautions: Standard, droplet, fall, hearing impaired, respiratory  Orthopedic Precautions: N/A  Braces: N/A         Subjective:  Communicated with pt, family and nurse - Aneta  prior to session.      Pain Ratin/10                   Objective:  Patient found with: oxygen, telemetry, peripheral IV     Functional Mobility:  Bed Mobility:  Rolling/Turning Right: Stand by assistance  Scooting/Bridging: Minimum Assistance  Supine to Sit: Minimum Assistance  Sit to Supine: Stand by Assistance    Transfers:   Sit <> Stand Assistance: Minimum Assistance  Sit <> Stand Assistive Device: No Assistive Device    Functional Ambulation:     Activities of Daily Living:     Feeding adaptive equipment:      UE adaptive equipment:      LE adaptive equipment:                     Bathing adaptive equipment:     Balance:   Static Sit:   Dynamic Sit:   Static Stand:   Dynamic stand:     Therapeutic Activities and Exercises:  Pt agreed to therapeutic ex to BUE, seated at EOB with occasional min A for upright posture, increase wheezing noted, O2 on and educated pt on proper breathing techniques, completed yellow theraband ex to BUE 2 x 10 reps x 4 planes to increase fx strength to impact self care performance. Pt requires min A for sit to stand and min A for 6 side steps to HOB. Pt exhibits low endurance and increase weakness today.    AM-PAC 6 CLICK ADL   How much help from another person does this patient currently need?   1 = Unable, Total/Dependent Assistance  2 = A lot, Maximum/Moderate Assistance  3 = A little, Minimum/Contact Guard/Supervision  4 = None, Modified [place]/Independent          AM-PAC Raw Score CMS G-Code  Modifier Level of Impairment Assistance   6 % Total / Unable   7 - 9 CM 80 - 100% Maximal Assist   10 - 14 CL 60 - 80% Moderate Assist   15 - 19 CK 40 - 60% Moderate Assist   20 - 22 CJ 20 - 40% Minimal Assist   23 CI 1-20% SBA / CGA   24 CH 0% Independent/ Mod I     Patient left supine with all lines intact, call button in reach and family present    ASSESSMENT:  Todd Lee is a 85 y.o. male with a medical diagnosis of Hypoxia and presents with impaired self care and fx mobility. Pt cont to benefit skilled OT to max indep and to address impairments.     Rehab identified problem list/impairments: Rehab identified problem list/impairments: weakness, impaired endurance, gait instability, impaired functional mobilty, impaired self care skills, impaired balance, impaired cognition, decreased safety awareness, decreased coordination    Rehab potential is good.    Activity tolerance: Good    Discharge recommendations: Discharge Facility/Level Of Care Needs: nursing facility, skilled     Barriers to discharge: Barriers to Discharge: Decreased caregiver support    Equipment recommendations: none     GOALS:   Occupational Therapy Goals        Problem: Occupational Therapy Goal    Goal Priority Disciplines Outcome Interventions   Occupational Therapy Goal     OT, PT/OT Ongoing (interventions implemented as appropriate)    Description:  Goals to be met by: 3/9/17     Patient will increase functional independence with ADLs by performing:    UE Dressing with Minimal Assistance.  LE Dressing with Moderate Assistance.  Toileting from toilet with Contact Guard Assistance for hygiene and clothing management.   Toilet transfer to toilet with Stand-by Assistance.  Upper extremity exercise program x10 reps per handout, with supervision for minimal resistive ex to BUE.                Plan:  Patient to be seen 3 x/week to address the above listed problems via self-care/home management, therapeutic activities, therapeutic  exercises  Plan of Care expires: 03/09/17  Plan of Care reviewed with: patient, family         German Rico, CRISTOPHER  03/03/2017

## 2017-03-03 NOTE — SUBJECTIVE & OBJECTIVE
Interval History: Doing well, but on 2L o2 sating 90%, will try to wean o2 as tolerated if o2 sat >95%       Review of Systems   Constitutional: Negative for activity change, appetite change and fatigue.   Respiratory: Positive for shortness of breath. Negative for cough and chest tightness.    Cardiovascular: Negative for chest pain.   Gastrointestinal: Negative for abdominal pain, nausea and vomiting.   Skin: Negative for rash.   Neurological: Negative for dizziness, weakness, light-headedness and headaches.   Psychiatric/Behavioral: Negative for agitation and confusion.     Objective:     Vital Signs (Most Recent):  Temp: 97.9 °F (36.6 °C) (03/03/17 1549)  Pulse: 71 (03/03/17 1549)  Resp: 18 (03/03/17 1549)  BP: 122/72 (03/03/17 1549)  SpO2: (!) 90 % (03/03/17 1549) Vital Signs (24h Range):  Temp:  [97.8 °F (36.6 °C)-98.1 °F (36.7 °C)] 97.9 °F (36.6 °C)  Pulse:  [64-73] 71  Resp:  [17-20] 18  SpO2:  [90 %-94 %] 90 %  BP: (102-126)/(58-74) 122/72     Weight: 90.7 kg (200 lb)  Body mass index is 27.12 kg/(m^2).    Intake/Output Summary (Last 24 hours) at 03/03/17 1611  Last data filed at 03/03/17 0400   Gross per 24 hour   Intake          1185.83 ml   Output              100 ml   Net          1085.83 ml      Physical Exam   Constitutional: He is oriented to person, place, and time. He appears well-developed and well-nourished.   HENT:   Head: Normocephalic and atraumatic.   Eyes: EOM are normal. Pupils are equal, round, and reactive to light.   Neck: Normal range of motion. Neck supple.   Cardiovascular: Normal rate, regular rhythm, normal heart sounds and intact distal pulses.  Exam reveals no gallop and no friction rub.    No murmur heard.  Pulmonary/Chest: Effort normal and breath sounds normal.   Abdominal: Soft.   Musculoskeletal: Normal range of motion.   Neurological: He is alert and oriented to person, place, and time.   Skin: Skin is warm and dry.   Psychiatric: He has a normal mood and affect. His  behavior is normal.   Nursing note and vitals reviewed.      Significant Labs:   BMP:   Recent Labs  Lab 03/02/17  0427 03/03/17  0537   * 140*    142   K 3.7 3.8    107   CO2 26 27   BUN 16 20   CREATININE 1.1 1.0   CALCIUM 8.6* 8.4*   MG 2.0  --      CBC:   Recent Labs  Lab 03/02/17  0427 03/03/17  0537   WBC 12.53 6.71   HGB 14.4 13.8*   HCT 43.9 42.9    219       Significant Imaging: I have reviewed all pertinent imaging results/findings within the past 24 hours.

## 2017-03-03 NOTE — PROGRESS NOTES
Pt is having a poor appetite. Daughter states he is picky but still not eating what she brings. Told him he will continue to get weak and needs to eat and I will check to see how much he eats for dinner.

## 2017-03-03 NOTE — PLAN OF CARE
Problem: Patient Care Overview  Goal: Plan of Care Review  Outcome: Ongoing (interventions implemented as appropriate)  Pt updated on plan of care. Questions answered and understanding verbalized. AAO. VS stable. Pt free of falls, pain and injury at this time. Will continue to monitor

## 2017-03-03 NOTE — PROGRESS NOTES
"[facility] Hospital Medicine  Progress Note    Patient Name: Todd Lee  MRN: [id number]  Patient Class: IP- Inpatient   Admission Date: 3/1/2017  Length of Stay: 1 days  Attending Physician: Sherin Sue MD  Primary Care Provider: Carmelita Sanchez MD        Subjective:     Principal Problem:Hypoxia    HPI:  Todd Lee is a 85 y.o. Male with a PMH of DM, HTN, HLD, clotting disorder who was brought in by family for generalized weakness which began several days ago. Associated symptoms include a productive cough with yellow sputum, nausea, vomiting and decreased PO intake. The family reports that the patient recently suffered a fall at home resulting in two compression fractures and reports that the patient "has not been the same since".  The family at bedside report that the patient has not been eating or drinking "much" in the past few days. The patient denies any modifying factors. Patient denies fever, chills, CP, cough, mcclellan, pnd, orthopnea, palpitations, diaphoresis, headache, blurred vision, numbness, tingling, dizziness, localized weakness, abdominal pain, blood in stools, melena, hematemesis, urinary frequency, urgency, dysuria or hematuria. Family reports that the patient currently lives at home alone. CTA was negative for PE.    Hospital Course:       Interval History: Pt continues to have shortness of breath.  He has not been eating or drinking well since symptoms started, but has been feeling better since being admitted.    Review of Systems   Constitutional: Negative for activity change, appetite change and fatigue.   Respiratory: Positive for shortness of breath. Negative for cough and chest tightness.    Cardiovascular: Negative for chest pain.   Gastrointestinal: Negative for abdominal pain, nausea and vomiting.   Skin: Negative for rash.   Neurological: Negative for dizziness, weakness, light-headedness and headaches.   Psychiatric/Behavioral: Negative for " agitation and confusion.     Objective:     Vital Signs (Most Recent):  Temp: 97.8 °F (36.6 °C) (03/02/17 1627)  Pulse: 72 (03/02/17 1627)  Resp: 18 (03/02/17 1627)  BP: 110/64 (03/02/17 1627)  SpO2: (!) 92 % (03/02/17 1627) Vital Signs (24h Range):  Temp:  [97.6 °F (36.4 °C)-98.3 °F (36.8 °C)] 97.8 °F (36.6 °C)  Pulse:  [66-90] 72  Resp:  [17-32] 18  SpO2:  [90 %-96 %] 92 %  BP: (110-158)/(63-82) 110/64     Weight: 90.7 kg (200 lb)  Body mass index is 27.12 kg/(m^2).    Intake/Output Summary (Last 24 hours) at 03/02/17 1756  Last data filed at 03/02/17 0417   Gross per 24 hour   Intake           128.33 ml   Output              350 ml   Net          -221.67 ml      Physical Exam   Constitutional: He is oriented to person, place, and time. He appears well-developed and well-nourished.   HENT:   Head: Normocephalic and atraumatic.   Eyes: EOM are normal. Pupils are equal, round, and reactive to light.   Neck: Normal range of motion. Neck supple.   Cardiovascular: Normal rate, regular rhythm, normal heart sounds and intact distal pulses.  Exam reveals no gallop and no friction rub.    No murmur heard.  Pulmonary/Chest: Effort normal and breath sounds normal.   Abdominal: Soft.   Musculoskeletal: Normal range of motion.   Neurological: He is alert and oriented to person, place, and time.   Skin: Skin is warm and dry.   Psychiatric: He has a normal mood and affect. His behavior is normal.   Nursing note and vitals reviewed.      Significant Labs:   BMP:   Recent Labs  Lab 03/02/17 0427   *      K 3.7      CO2 26   BUN 16   CREATININE 1.1   CALCIUM 8.6*   MG 2.0     CBC:   Recent Labs  Lab 03/01/17  1200 03/02/17 0427   WBC 11.11 12.53   HGB 15.8 14.4   HCT 46.2 43.9    231       Significant Imaging: I have reviewed all pertinent imaging results/findings within the past 24 hours.    Assessment/Plan:      * Hypoxia  - Supplemental O2 - wean as tolerated to keep sat >92%  - pulse ox  - neb tx  -  DC empiric avelox        Type 2 diabetes mellitus without complication, without long-term current use of insulin  - SSI  - diabetic diet      Hypertension  - monitor VS   - resume home meds       Generalized weakness  - PT/OT - pt has home pt/ot  - fall precautions       Influenza A  - patient started on Tamiflu overnight.  - cont dyspnea, on o2      History of pulmonary embolism  - continue coumadin      VTE Risk Mitigation         Ordered     warfarin (COUMADIN) tablet 7.5 mg  Every Sunday     Route:  Oral        03/01/17 1850     warfarin split tablet 3.75 mg  Every Friday     Route:  Oral        03/01/17 1850     warfarin (COUMADIN) tablet 7.5 mg  Every Mon, Tues, Thurs, Sat     Route:  Oral        03/01/17 1850     Medium Risk of VTE  Once      03/01/17 2245     warfarin (COUMADIN) tablet 7.5 mg  Every Wednesday     Route:  Oral        03/01/17 1857          Sherin Sue MD  Department of Hospital Medicine   [facility]

## 2017-03-03 NOTE — PHYSICIAN QUERY
"PT Name: Todd Lee  MR #: [id number]    Physician Query Form -Respiratory Condition Clarification    Reviewer  [email or web address]      [phone]    This form is a permanent document in the medical record.    Query Date: March 3, 2017    By submitting this query, we are merely seeking further clarification of documentation. Please utilize your independent clinical judgment when addressing the question(s) below.  (The Medical record reflects the following:)   Indicators   Supporting Clinical Findings Location in Medical Record   x "Wheezing", "Productive cough", "SOB", "NAVARRETE", "Use of accessory muscles" documented SOB   Wheezing ED MD and H&P  ED MD    Chest X-Ray =      x "Hypoxia" documented Hypoxia H&P   x Respiratory Distress or Failure documented Mild respiratory distress ED MD   x RR=        PaO2=      PaCO2=      O2 sat= RR=17 to 32  O2 sat 86% on room air  O2 sat=91 to 96% w/oxygen    PaO2=34.9  PaCO2=59  O2 sat=90  VS Record 3-1 to 3-3  Vs Record 3-1      ABG 3-1   x Treatment: Oseltamivir p.o. Mar    BiPAP/Intubation     x Supplemental O2/Home O2: Oxygen 2-4 LNC Vs Record 3-1 to 3-3    Oxygen dependence      Other: Influenza A   - patient started on Tamiflu overnight.   - cont dyspnea, on o2  Pn 3-2   Provider, please specify diagnosis or diagnoses associated with above clinical findings.    [x  ] Acute Respiratory Failure  [  ] Chronic Respiratory Failure  [  ] Acute on Chronic Respiratory Failure  [  ] Other Respiratory Diagnosis (Specify)        [  ] Clinically Undetermined    Please document in your progress notes daily for the duration of treatment, until resolved, and include in your discharge summary.                                                                                      "

## 2017-03-03 NOTE — PLAN OF CARE
Problem: ARDS (Acute Resp Distress Syndrome) (Adult)  Goal: Signs and Symptoms of Listed Potential Problems Will be Absent, Minimized or Managed (ARDS)  Signs and symptoms of listed potential problems will be absent, minimized or managed by discharge/transition of care (reference ARDS (Acute Resp Distress Syndrome) (Adult) CPG).   Outcome: Ongoing (interventions implemented as appropriate)  o2 sat on nc 4l/m=94%; tolerates txs well; no resp distress noted.

## 2017-03-03 NOTE — PLAN OF CARE
Use Droplet Precautions when caring for this patient. Surgical mask,  gown to protect clothing/scrubs, and gloves.  Pt to remain in Isolation until treatment ends, afebrile and asymptomatic.  Document Education to Pt, Family and Visitors regarding Influenza and Prevention of.  Thank you.

## 2017-03-03 NOTE — PLAN OF CARE
Problem: Patient Care Overview  Goal: Plan of Care Review  Outcome: Ongoing (interventions implemented as appropriate)  Pt more alert today and speaking. NC decreased to 3L. Pt positive for Flu A. Shortness of breath noted with activity. Pt walk to bathroom X2 assist. PIV intact IV fluids infusing and IV ABX given.Turn Q2 with wedge, refusing to get in chair, pt remained free of falls during shift, no skin breakdown noted at this time, bed alarm set, call light in reach, room free of clutter, side rails up X2, pt on telemetry monitor SR, will continue to monitor, hourly rounding made.

## 2017-03-03 NOTE — PROGRESS NOTES
Spoke with pt several times about getting up out of bed and into the chair since he had family here. He refused and states he is just fine in the bed.

## 2017-03-03 NOTE — PROGRESS NOTES
"[facility] Hospital Medicine  Progress Note    Patient Name: Todd Lee  MRN: [id number]  Patient Class: IP- Inpatient   Admission Date: 3/1/2017  Length of Stay: 2 days  Attending Physician: Sherin Sue MD  Primary Care Provider: Carmelita Sanchez MD        Subjective:     Principal Problem:Hypoxia    HPI:  Todd Lee is a 85 y.o. Male with a PMH of DM, HTN, HLD, clotting disorder who was brought in by family for generalized weakness which began several days ago. Associated symptoms include a productive cough with yellow sputum, nausea, vomiting and decreased PO intake. The family reports that the patient recently suffered a fall at home resulting in two compression fractures and reports that the patient "has not been the same since".  The family at bedside report that the patient has not been eating or drinking "much" in the past few days. The patient denies any modifying factors. Patient denies fever, chills, CP, cough, mcclellan, pnd, orthopnea, palpitations, diaphoresis, headache, blurred vision, numbness, tingling, dizziness, localized weakness, abdominal pain, blood in stools, melena, hematemesis, urinary frequency, urgency, dysuria or hematuria. Family reports that the patient currently lives at home alone. CTA was negative for PE.    Hospital Course:       Interval History: Doing well, but on 2L o2 sating 90%, will try to wean o2 as tolerated if o2 sat >95%       Review of Systems   Constitutional: Negative for activity change, appetite change and fatigue.   Respiratory: Positive for shortness of breath. Negative for cough and chest tightness.    Cardiovascular: Negative for chest pain.   Gastrointestinal: Negative for abdominal pain, nausea and vomiting.   Skin: Negative for rash.   Neurological: Negative for dizziness, weakness, light-headedness and headaches.   Psychiatric/Behavioral: Negative for agitation and confusion.     Objective:     Vital Signs (Most " Recent):  Temp: 97.9 °F (36.6 °C) (03/03/17 1549)  Pulse: 71 (03/03/17 1549)  Resp: 18 (03/03/17 1549)  BP: 122/72 (03/03/17 1549)  SpO2: (!) 90 % (03/03/17 1549) Vital Signs (24h Range):  Temp:  [97.8 °F (36.6 °C)-98.1 °F (36.7 °C)] 97.9 °F (36.6 °C)  Pulse:  [64-73] 71  Resp:  [17-20] 18  SpO2:  [90 %-94 %] 90 %  BP: (102-126)/(58-74) 122/72     Weight: 90.7 kg (200 lb)  Body mass index is 27.12 kg/(m^2).    Intake/Output Summary (Last 24 hours) at 03/03/17 1611  Last data filed at 03/03/17 0400   Gross per 24 hour   Intake          1185.83 ml   Output              100 ml   Net          1085.83 ml      Physical Exam   Constitutional: He is oriented to person, place, and time. He appears well-developed and well-nourished.   HENT:   Head: Normocephalic and atraumatic.   Eyes: EOM are normal. Pupils are equal, round, and reactive to light.   Neck: Normal range of motion. Neck supple.   Cardiovascular: Normal rate, regular rhythm, normal heart sounds and intact distal pulses.  Exam reveals no gallop and no friction rub.    No murmur heard.  Pulmonary/Chest: Effort normal and breath sounds normal.   Abdominal: Soft.   Musculoskeletal: Normal range of motion.   Neurological: He is alert and oriented to person, place, and time.   Skin: Skin is warm and dry.   Psychiatric: He has a normal mood and affect. His behavior is normal.   Nursing note and vitals reviewed.      Significant Labs:   BMP:   Recent Labs  Lab 03/02/17 0427 03/03/17  0537   * 140*    142   K 3.7 3.8    107   CO2 26 27   BUN 16 20   CREATININE 1.1 1.0   CALCIUM 8.6* 8.4*   MG 2.0  --      CBC:   Recent Labs  Lab 03/02/17 0427 03/03/17  0537   WBC 12.53 6.71   HGB 14.4 13.8*   HCT 43.9 42.9    219       Significant Imaging: I have reviewed all pertinent imaging results/findings within the past 24 hours.    Assessment/Plan:      * Hypoxia  - Supplemental O2 - wean as tolerated to keep sat >92%  - pulse ox  - neb tx  - DC empiric  avelox        Type 2 diabetes mellitus without complication, without long-term current use of insulin  - SSI  - diabetic diet      Hyperlipidemia  - resume statin       Hypertension  - monitor VS   - resume home meds       Generalized weakness  - PT/OT - pt has home pt/ot  - fall precautions       Influenza A  - cont tamiflu  - cont dyspnea, on o2      History of pulmonary embolism  - continue coumadin (INR 1.7)      VTE Risk Mitigation         Ordered     warfarin (COUMADIN) tablet 7.5 mg  Every Sunday     Route:  Oral        03/01/17 1850     warfarin split tablet 3.75 mg  Every Friday     Route:  Oral        03/01/17 1850     warfarin (COUMADIN) tablet 7.5 mg  Every Mon, Tues, Thurs, Sat     Route:  Oral        03/01/17 1850     Medium Risk of VTE  Once      03/01/17 2245     warfarin (COUMADIN) tablet 7.5 mg  Every Wednesday     Route:  Oral        03/01/17 1857          Sherin Sue MD  Department of Hospital Medicine   [facility]

## 2017-03-03 NOTE — PLAN OF CARE
Problem: Patient Care Overview  Goal: Plan of Care Review  Outcome: Ongoing (interventions implemented as appropriate)  PT WITH INCREASED CONFUSION TODAY, AMB WITH JORGE LUIS IN ROOM ONLY USING RW, RETURN TO BED WITH BED ALARM ON

## 2017-03-04 PROBLEM — R53.1 GENERALIZED WEAKNESS: Status: RESOLVED | Noted: 2017-03-01 | Resolved: 2017-03-04

## 2017-03-04 PROBLEM — Z86.711 HISTORY OF PULMONARY EMBOLISM: Status: RESOLVED | Noted: 2017-03-02 | Resolved: 2017-03-04

## 2017-03-04 PROBLEM — J96.01 ACUTE HYPOXEMIC RESPIRATORY FAILURE: Status: ACTIVE | Noted: 2017-03-04

## 2017-03-04 LAB
INR PPP: 1.9
POCT GLUCOSE: 166 MG/DL (ref 70–110)
POCT GLUCOSE: 175 MG/DL (ref 70–110)
POCT GLUCOSE: 208 MG/DL (ref 70–110)
PROTHROMBIN TIME: 19.6 SEC

## 2017-03-04 PROCEDURE — 85610 PROTHROMBIN TIME: CPT

## 2017-03-04 PROCEDURE — 94761 N-INVAS EAR/PLS OXIMETRY MLT: CPT

## 2017-03-04 PROCEDURE — 97530 THERAPEUTIC ACTIVITIES: CPT

## 2017-03-04 PROCEDURE — 94640 AIRWAY INHALATION TREATMENT: CPT

## 2017-03-04 PROCEDURE — 27000221 HC OXYGEN, UP TO 24 HOURS

## 2017-03-04 PROCEDURE — 25000242 PHARM REV CODE 250 ALT 637 W/ HCPCS: Performed by: EMERGENCY MEDICINE

## 2017-03-04 PROCEDURE — 94799 UNLISTED PULMONARY SVC/PX: CPT

## 2017-03-04 PROCEDURE — 36415 COLL VENOUS BLD VENIPUNCTURE: CPT

## 2017-03-04 PROCEDURE — 25000003 PHARM REV CODE 250: Performed by: NURSE PRACTITIONER

## 2017-03-04 PROCEDURE — 97116 GAIT TRAINING THERAPY: CPT

## 2017-03-04 PROCEDURE — 25000003 PHARM REV CODE 250: Performed by: INTERNAL MEDICINE

## 2017-03-04 PROCEDURE — 21400001 HC TELEMETRY ROOM

## 2017-03-04 RX ORDER — OSELTAMIVIR PHOSPHATE 75 MG/1
75 CAPSULE ORAL 2 TIMES DAILY
Qty: 10 CAPSULE | Refills: 0 | Status: SHIPPED | OUTPATIENT
Start: 2017-03-04 | End: 2017-03-09

## 2017-03-04 RX ORDER — ALBUTEROL SULFATE 0.83 MG/ML
2.5 SOLUTION RESPIRATORY (INHALATION)
Qty: 120 EACH | Refills: 0 | Status: SHIPPED | OUTPATIENT
Start: 2017-03-04 | End: 2018-03-04

## 2017-03-04 RX ADMIN — IPRATROPIUM BROMIDE AND ALBUTEROL SULFATE 3 ML: .5; 3 SOLUTION RESPIRATORY (INHALATION) at 07:03

## 2017-03-04 RX ADMIN — IPRATROPIUM BROMIDE AND ALBUTEROL SULFATE 3 ML: .5; 3 SOLUTION RESPIRATORY (INHALATION) at 09:03

## 2017-03-04 RX ADMIN — WARFARIN SODIUM 7.5 MG: 2.5 TABLET ORAL at 05:03

## 2017-03-04 RX ADMIN — FAMOTIDINE 20 MG: 20 TABLET ORAL at 09:03

## 2017-03-04 RX ADMIN — ATORVASTATIN CALCIUM 10 MG: 10 TABLET, FILM COATED ORAL at 09:03

## 2017-03-04 RX ADMIN — IPRATROPIUM BROMIDE AND ALBUTEROL SULFATE 3 ML: .5; 3 SOLUTION RESPIRATORY (INHALATION) at 01:03

## 2017-03-04 RX ADMIN — FELODIPINE 2.5 MG: 2.5 TABLET, EXTENDED RELEASE ORAL at 09:03

## 2017-03-04 RX ADMIN — OSELTAMIVIR PHOSPHATE 75 MG: 75 CAPSULE ORAL at 09:03

## 2017-03-04 RX ADMIN — IPRATROPIUM BROMIDE AND ALBUTEROL SULFATE 3 ML: .5; 3 SOLUTION RESPIRATORY (INHALATION) at 12:03

## 2017-03-04 RX ADMIN — INSULIN ASPART 1 UNITS: 100 INJECTION, SOLUTION INTRAVENOUS; SUBCUTANEOUS at 09:03

## 2017-03-04 NOTE — PROGRESS NOTES
Spoke with [employer] at [phone]. The on call rep states that they in fact service the [place] area however they would deliver 02 to the patients home with approval from the representatives manager. The rep was given the unit number here with instructions to speak with the charge nurse. If approved orders can be faxed to [phone]    He states however they will only deliver to the patients home. The patient would have to be supplied with a loaner tank from [facility] with understanding the family would return the tank . Will update OC

## 2017-03-04 NOTE — DISCHARGE SUMMARY
"[facility] Hospital Medicine  Discharge Summary      Patient Name: Todd Lee  MRN: [id number]  Admission Date: 3/1/2017  Hospital Length of Stay: 3 days  Discharge Date and Time:  03/04/2017 2:41 PM  Attending Physician: Roberto Carlos Potter MD   Discharging Provider: Roberto Carlos Potter MD  Primary Care Provider: Carmelita Sanchez MD      HPI:   Todd Lee is a 85 y.o. Male with a PMH of DM, HTN, HLD, clotting disorder who was brought in by family for generalized weakness which began several days ago. Associated symptoms include a productive cough with yellow sputum, nausea, vomiting and decreased PO intake. The family reports that the patient recently suffered a fall at home resulting in two compression fractures and reports that the patient "has not been the same since".  The family at bedside report that the patient has not been eating or drinking "much" in the past few days. The patient denies any modifying factors. Patient denies fever, chills, CP, cough, mcclellan, pnd, orthopnea, palpitations, diaphoresis, headache, blurred vision, numbness, tingling, dizziness, localized weakness, abdominal pain, blood in stools, melena, hematemesis, urinary frequency, urgency, dysuria or hematuria. Family reports that the patient currently lives at home alone. CTA was negative for PE.    * No surgery found *      Indwelling Lines/Drains at time of discharge:   Lines/Drains/Airways          No matching active lines, drains, or airways        Hospital Course:   Pt admitted for Infleunza A and treated with Tamiflu. CXR did not show any Pneumonia. He responded to treatment well and all his symptoms have significantly improved. He had mild resp failure with hypoxemia on admission and his PO2 was 59. He was treated with O2 and nebs. He is doing very well and now wants to go home. His daughter also notes that he looks much better. He lives at home alone and is fairly independent and has a lady friend who helps " him also with his ADLs. He also qualified for home O2 as his Sats dropped to 88% on RA w exercise. He was seen and examined today and deemed stable to be discharged home today with Home Health and Home O2 and needs Tamiflu for another 3 days.       Consults:   Consults         Status Ordering Provider     Inpatient consult to Dietary  Once     Provider:  (Not yet assigned)    Completed SARA JETER     Inpatient consult to Social Work  Once     Provider:  (Not yet assigned)    Completed SARA JETER     Nutrition Services Referral  Once     Provider:  (Not yet assigned)    Completed JEFFREY LOCK     Pharmacy to dose Warfarin consult  Once     Provider:  (Not yet assigned)    Acknowledged JEFFREY LOCK          Significant Diagnostic Studies: Labs:   BMP:   Recent Labs  Lab 03/03/17  0537   *      K 3.8      CO2 27   BUN 20   CREATININE 1.0   CALCIUM 8.4*   , CBC   Recent Labs  Lab 03/03/17  0537   WBC 6.71   HGB 13.8*   HCT 42.9        All labs within the past 24 hours have been reviewed  Microbiology:   Blood Culture   Lab Results   Component Value Date    LABBLOO No Growth to date 03/01/2017    LABBLOO No Growth to date 03/01/2017    LABBLOO No Growth to date 03/01/2017     Imaging Results         CTA Chest Non-Coronary (Final result) Result time:  03/01/17 13:50:39    Final result by Yonatan Singh III, MD (03/01/17 13:50:39)    Impression:      1. Patient respiratory motion causes artifactual filling defects within several bilateral lower lobe pulmonary arteries. No evidence of pulmonary embolism in the remaining pulmonary arteries.    2. Mild dilatation of the proximal descending thoracic aorta measuring up to 3.8 cm.  3.  Chronic subpleural interstitial thickening possibly related to mild chronic interstitial lung disease.  No acute appearing infiltrates identified.            Electronically signed by: YONATAN SINGH MD  Date:     03/01/17  Time:    13:50     Narrative:     CTA CHEST NON CORONARY    Clinical history: Shortness of breath (786.05).  Cough.  Possible pulmonary embolism    Technique: Routine CT angiogram of the chest protocol performed after the IV administration of 100 mL Omnipaque 350. 3D reconstructions/reformats/MIPs obtained. All CT scans at this facility use dose modulation, iterative reconstruction, and/or weight based dosing when appropriate to reduce radiation dose to as low as reasonably achievable.    Comparison: None    Findings: There is patient respiratory motion artifact causing artifactual filling defects within several bilateral lower lobe pulmonary arteries. There is no evidence of pulmonary embolism in the remaining pulmonary arteries.  There is mild dilatation of the proximal descending thoracic aorta and distal aortic arch measuring up to 3.8 cm in diameter. No aortic dissection is identified.  There is no cardiomegaly or pericardial effusion.  No pathologically enlarged lymph nodes are seen in the chest.  There is chronic subpleural interstitial thickening in both lungs, most prominent in the lung bases.  Bibasilar dependent atelectasis is noted.  The lungs are otherwise clear without evidence of acute airspace consolidation, effusion, pneumothorax or other acute pulmonary disease.  No acute osseous abnormality is seen. Limited images through the upper abdomen demonstrate no acute findings.            X-Ray Chest AP Portable (Final result) Result time:  03/01/17 12:37:50    Final result by Yonatan Singh III, MD (03/01/17 12:37:50)    Impression:     Possible trace left pleural effusion with mild adjacent left basilar discoid atelectasis.  Stable mild chronic interstitial thickening.        Electronically signed by: YONATAN SINGH MD  Date:     03/01/17  Time:    12:37     Narrative:    XR CHEST AP PORTABLE    Clinical history: sob.      Findings: Heart size is within normal limits for AP technique.  Stable aortic atherosclerosis and tortuosity. There  is stable mild chronic coarsening of the interstitial markings.  There is mild blunting of the left costophrenic angle suggesting a small pleural effusion. There is mild adjacent discoid atelectasis in the left lung base.  The remainder of the lungs appear clear of active disease.              Pending Diagnostic Studies:     None        Final Active Diagnoses:    Diagnosis Date Noted POA    PRINCIPAL PROBLEM:  Hypoxia [R09.02] 03/01/2017 Yes    Acute hypoxemic respiratory failure [J96.01] 03/04/2017 Yes    Influenza A [J10.1] 03/02/2017 Yes    Type 2 diabetes mellitus without complication, without long-term current use of insulin [E11.9]  Yes     Chronic    Hyperlipidemia [E78.5]  Yes     Chronic    Hypertension [I10]  Yes     Chronic      Problems Resolved During this Admission:    Diagnosis Date Noted Date Resolved POA    History of pulmonary embolism [Z86.711] 03/02/2017 03/04/2017 Yes    Generalized weakness [R53.1] 03/01/2017 03/04/2017 Yes      No new Assessment & Plan notes have been filed under this hospital service since the last note was generated.  Service: Hospital Medicine      Discharged Condition: stable    Disposition: Home-Health Care [facility]    Follow Up:  Follow-up Information     Follow up with Carmelita Sanchez MD. Schedule an appointment as soon as possible for a visit in 1 week.    Specialty:  Family Medicine    Why:  and , As needed    Contact information:    [address] [place] [address] DR Keila COVARRUBIAS [address] [phone]          Patient Instructions:     Ambulatory referral to Outpatient Case Management   Referral Priority: Routine Referral Type: Consultation   Referral Reason: Specialty Services Required    Number of Visits Requested: 1      Ambulatory referral to Home Health   Referral Priority: Routine Referral Type: Home Health   Referral Reason: Specialty Services Required    Requested Specialty: Home Health Services    Number of Visits Requested: 1      Diet general   Order Specific  Question Answer Comments   Na restriction, if any: 2gNa    Additional restrictions: Diabetic 1800      Activity as tolerated       Medications:  Reconciled Home Medications:   Current Discharge Medication List      START taking these medications    Details   oseltamivir (TAMIFLU) 75 MG capsule Take 1 capsule (75 mg total) by mouth 2 (two) times daily.  Qty: 10 capsule, Refills: 0         CONTINUE these medications which have NOT CHANGED    Details   acetaminophen-codeine 300-30mg (TYLENOL #3) 300-30 mg Tab Take 1 tablet by mouth every 8 (eight) hours as needed.  Qty: 30 tablet, Refills: 0    Associated Diagnoses: Other chronic pain      atorvastatin (LIPITOR) 10 MG tablet TAKE 1 TABLET EVERY DAY  Qty: 90 tablet, Refills: 3      felodipine (PLENDIL) 2.5 MG Tb24 TAKE 1 TABLET EVERY DAY  Qty: 30 tablet, Refills: 11      hydrocodone-acetaminophen 10-325mg (NORCO)  mg Tab Take 1 tablet by mouth every 4 (four) hours as needed.  Qty: 20 tablet, Refills: 0    Associated Diagnoses: Acute midline low back pain without sciatica; Compression fracture of first lumbar vertebra, with routine healing, subsequent encounter; Compression fracture of L3 lumbar vertebra, with routine healing, subsequent encounter      warfarin (COUMADIN) 7.5 MG tablet Take 1/2 tablet onand Fridays and 1 tablet all remaining days as directed by the coumadin clinic.  Qty: 30 tablet, Refills: 3    Associated Diagnoses: Long term (current) use of anticoagulants      fluticasone (FLONASE) 50 mcg/actuation nasal spray 1 spray by Each Nare route once daily.  Qty: 1 Bottle, Refills: 0    Associated Diagnoses: Sinus problem      meclizine (ANTIVERT) 25 mg tablet Take 1 tablet (25 mg total) by mouth 3 (three) times daily as needed.  Qty: 30 tablet, Refills: 0    Associated Diagnoses: Dizziness           Time spent on the discharge of patient: 41 minutes    Roberto Carlos Potter MD  Department of Hospital Medicine  [facility]

## 2017-03-04 NOTE — PROGRESS NOTES
Home Oxygen Evaluation    Date Performed: 3/4/2017    1) Patient's Home O2 Sat on room air, while at rest: 90        If O2 sats on room air at rest are 88% or below, patient qualifies. No additional testing needed. Document N/A in steps 2 and 3. If 89% or above, complete steps 2.      2) Patient's O2 Sat on room air while exercisin        If O2 sats on room air while exercising remain 89% or above patient does not qualify, no further testing needed Document N/A in step 3. If O2 sats on room air while exercising are 88% or below, continue to step 3.      3) Patient's O2 Sat while exercising on O2: 93 at 2 LPM         (Must show improvement from #2 for patients to qualify)    If O2 sats improve on oxygen, patient qualifies for portable oxygen. If not, the patient does not qualify.

## 2017-03-04 NOTE — PLAN OF CARE
Problem: Physical Therapy Goal  Goal: Physical Therapy Goal  LTGS TO BE MET IN 7 DAYS (3-9-17)  1. PT WILL REQUIRE SBA FOR BED MOBILITY  2. PT WILL REQUIRE SBA FOR TFS USING RW  3. PT WILL  WITH RW AND CGA  4. PT WILL TOLERATE BLE THEREX X 20 REPS AROM  5. PT WILL DEMO F DYNAMIC BALANCE DURING GAIT   Outcome: Ongoing (interventions implemented as appropriate)  Min A supine to sit for trunk ascent.  Ambulate within room using RW CGA.

## 2017-03-04 NOTE — PLAN OF CARE
Problem: Patient Care Overview  Goal: Individualization & Mutuality  1. P.T. NOTE: PT CURRENTLY REQUIRES JORGE LUIS FOR BED MOBILITY, TFS, AND GAIT USING RW   Outcome: Ongoing (interventions implemented as appropriate)  Pt is AAO x 4. VSS. Pt remained free of injury this shift. Bed in low position w/ wheels locked.

## 2017-03-04 NOTE — PT/OT/SLP PROGRESS
Physical Therapy  Treatment    Todd Lee   MRN: [id number]   Admitting Diagnosis: Hypoxia    PT Received On: 17  PT Start Time: 842     PT Stop Time: 902    PT Total Time (min): 20 min       Billable Minutes:  Gait [id number] and Therapeutic Activity 10    Treatment Type: Treatment  PT/PTA: PT             General Precautions: Standard, droplet, fall, contact  Orthopedic Precautions: N/A   Braces:           Subjective:  Communicated with Sandra and Trini prior to session.      Pain Ratin/10              Pain Rating Post-Intervention: 0/10    Objective:   Patient found with: oxygen, peripheral IV    Functional Mobility:  Bed Mobility:   Rolling/Turning to Left: Stand by assistance  Scooting/Bridging: Minimum Assistance  Supine to Sit: Minimum Assistance    Transfers:  Sit <> Stand Assistance: Stand By Assistance  Sit <> Stand Assistive Device: Rolling Walker  Bed <> Chair Technique: Stand Pivot  Bed <> Chair Assistance: Contact Guard Assistance  Bed <> Chair Assistive Device: Rolling Walker    Gait:   Gait Distance:  (50' within room)  Assistance 1: Contact Guard Assistance  Gait Assistive Device: Rolling walker  Gait Pattern: swing-through gait  Gait Deviation(s): decreased step length, decreased toe-to-floor clearance    Stairs:      Balance:   Static Sit: GOOD: Takes MODERATE challenges from all directions  Dynamic Sit: GOOD-: Maintains balance through MODERATE excursions of active trunk movement,     Static Stand: FAIR+: Takes MINIMAL challenges from all directions  Dynamic stand: FAIR: Needs CONTACT GUARD during gait     Therapeutic Activities and Exercises:  Seated hip flexion, knee extension and ankle pumps x10 bilaterally.  Pt seated in chair for breakfast.       AM-PAC 6 CLICK MOBILITY  How much help from another person does this patient currently need?   1 = Unable, Total/Dependent Assistance  2 = A lot, Maximum/Moderate Assistance  3 = A little, Minimum/Contact Guard/Supervision  4 =  None, Modified [place]/Independent         AM-PAC Raw Score CMS G-Code Modifier Level of Impairment Assistance   6 % Total / Unable   7 - 9 CM 80 - 100% Maximal Assist   10 - 14 CL 60 - 80% Moderate Assist   15 - 19 CK 40 - 60% Moderate Assist   20 - 22 CJ 20 - 40% Minimal Assist   23 CI 1-20% SBA / CGA   24 CH 0% Independent/ Mod I     Patient left up in chair with all lines intact, call button in reach and nurse notified.    Assessment:  Todd Lee is a 85 y.o. male with a medical diagnosis of Hypoxia and presents with decreased functional mobility.    Rehab identified problem list/impairments: Rehab identified problem list/impairments: weakness, impaired functional mobilty, impaired balance, gait instability    Rehab potential is good.    Activity tolerance: Good    Discharge recommendations: Discharge Facility/Level Of Care Needs: home with home health     Barriers to discharge: Barriers to Discharge: Decreased caregiver support    Equipment recommendations: Equipment Needed After Discharge: walker, rolling     GOALS:   Physical Therapy Goals        Problem: Physical Therapy Goal    Goal Priority Disciplines Outcome Goal Variances Interventions   Physical Therapy Goal     PT/OT, PT Ongoing (interventions implemented as appropriate)     Description:  LTG'S TO BE MET IN 7 DAYS (3-9-17)  1. PT WILL REQUIRE SBA FOR BED MOBILITY  2. PT WILL REQUIRE SBA FOR TF'S USING RW  3. PT WILL ' WITH RW AND CGA  4. PT WILL TOLERATE BLE THEREX X 20 REPS AROM  5. PT WILL DEMO F DYNAMIC BALANCE DURING GAIT              PLAN:    Patient to be seen  (pt will be seen a min of 5-7 days as tolerated)  to address the above listed problems via gait training, therapeutic activities, therapeutic exercises  Plan of Care expires: 03/09/17  Plan of Care reviewed with: patient         Kalani Campos, PT  03/04/2017

## 2017-03-04 NOTE — PLAN OF CARE
Problem: Patient Care Overview  Goal: Plan of Care Review  Outcome: Ongoing (interventions implemented as appropriate)  Pt tolerates tx well no distress noted.

## 2017-03-05 VITALS
RESPIRATION RATE: 20 BRPM | SYSTOLIC BLOOD PRESSURE: 129 MMHG | BODY MASS INDEX: 27.09 KG/M2 | TEMPERATURE: 98 F | HEIGHT: 72 IN | HEART RATE: 91 BPM | DIASTOLIC BLOOD PRESSURE: 69 MMHG | WEIGHT: 200 LBS | OXYGEN SATURATION: 95 %

## 2017-03-05 LAB
INR PPP: 2.2
POCT GLUCOSE: 148 MG/DL (ref 70–110)
POCT GLUCOSE: 171 MG/DL (ref 70–110)
PROTHROMBIN TIME: 22 SEC

## 2017-03-05 PROCEDURE — 94761 N-INVAS EAR/PLS OXIMETRY MLT: CPT

## 2017-03-05 PROCEDURE — 27000221 HC OXYGEN, UP TO 24 HOURS

## 2017-03-05 PROCEDURE — 25000242 PHARM REV CODE 250 ALT 637 W/ HCPCS: Performed by: EMERGENCY MEDICINE

## 2017-03-05 PROCEDURE — 25000003 PHARM REV CODE 250: Performed by: NURSE PRACTITIONER

## 2017-03-05 PROCEDURE — 85610 PROTHROMBIN TIME: CPT

## 2017-03-05 PROCEDURE — 94640 AIRWAY INHALATION TREATMENT: CPT

## 2017-03-05 PROCEDURE — 25000003 PHARM REV CODE 250: Performed by: INTERNAL MEDICINE

## 2017-03-05 PROCEDURE — 36415 COLL VENOUS BLD VENIPUNCTURE: CPT

## 2017-03-05 RX ADMIN — ATORVASTATIN CALCIUM 10 MG: 10 TABLET, FILM COATED ORAL at 08:03

## 2017-03-05 RX ADMIN — IPRATROPIUM BROMIDE AND ALBUTEROL SULFATE 3 ML: .5; 3 SOLUTION RESPIRATORY (INHALATION) at 12:03

## 2017-03-05 RX ADMIN — FAMOTIDINE 20 MG: 20 TABLET ORAL at 08:03

## 2017-03-05 RX ADMIN — IPRATROPIUM BROMIDE AND ALBUTEROL SULFATE 3 ML: .5; 3 SOLUTION RESPIRATORY (INHALATION) at 07:03

## 2017-03-05 RX ADMIN — OSELTAMIVIR PHOSPHATE 75 MG: 75 CAPSULE ORAL at 08:03

## 2017-03-05 RX ADMIN — FELODIPINE 2.5 MG: 2.5 TABLET, EXTENDED RELEASE ORAL at 08:03

## 2017-03-05 NOTE — PLAN OF CARE
Problem: Patient Care Overview  Goal: Plan of Care Review  Outcome: Ongoing (interventions implemented as appropriate)  Patient AAOx4.  Vitals stable.  No complaints of pain. Droplet precautions maintained. Bed alarm activated.  No falls on shift.  [place] encouraged

## 2017-03-05 NOTE — NURSING
[facility] returned call and stated they are unable to provide O2 due to distace away from their office.

## 2017-03-05 NOTE — PROGRESS NOTES
Patient discharge instructions given. Patient verbalized understanding. Patient notified concerning follow up appts, verbalized understanding for proper scheduling. Educated on importance of follow up appts. IV removed, cath tip remains intact. Tele monitor removed and returned. Patient discharged to home with 2 loaner oxygen tanks per Charge Nurse.

## 2017-03-05 NOTE — NURSING
[facility] notified that patient will pay out of pocket since Insurance will not pay. Stated price of $225 to patient and family. Daughter states she will pay for it. Company will call patient in room for payment and we will provide 2 tanks for the patient to bring back when they follow up with clinic appointment.

## 2017-03-05 NOTE — PROGRESS NOTES
Home Oxygen Evaluation    Date Performed: 3/5/2017    1) Patient's Home O2 Sat on room air, while at rest: 93        If O2 sats on room air at rest are 88% or below, patient qualifies. No additional testing needed. Document N/A in steps 2 and 3. If 89% or above, complete steps 2.      2) Patient's O2 Sat on room air while exercisin        If O2 sats on room air while exercising remain 89% or above patient does not qualify, no further testing needed Document N/A in step 3. If O2 sats on room air while exercising are 88% or below, continue to step 3.      3) Patient's O2 Sat while exercising on O2: 93 at 2 LPM         (Must show improvement from #2 for patients to qualify)    If O2 sats improve on oxygen, patient qualifies for portable oxygen. If not, the patient does not qualify.

## 2017-03-05 NOTE — NURSING
[facility] unable to provide O2 due to patient not having a proper Dx for qualifing for O2. Dr Potter notified.

## 2017-03-06 LAB
BACTERIA BLD CULT: NORMAL
BACTERIA BLD CULT: NORMAL

## 2017-03-06 NOTE — PLAN OF CARE
CM contacted [facility] and faxed all discharge information to [phone] to have services resumed.

## 2017-03-07 ENCOUNTER — PATIENT OUTREACH (OUTPATIENT)
Dept: ADMINISTRATIVE | Facility: CLINIC | Age: 82
End: 2017-03-07
Payer: MEDICARE

## 2017-03-07 NOTE — PATIENT INSTRUCTIONS
Influenza (Adult)    Influenza is also called the flu. It is a viral illness that affects the air passages of your lungs. It is different from the common cold. The flu can easily be passed from one to person to another. It may be spread through the air by coughing and sneezing. Or it can be spread by touching the sick person and then touching your own eyes, nose, or mouth.  The flu starts 1 to 3 days after you are exposed to the flu virus. It may last for 1 to 2 weeks. You usually dont need to take antibiotics unless you have a complication. This might be an ear or sinus infection or pneumonia.  Symptoms of the flu may be mild or severe. They can include extreme tiredness (wanting to stay in bed all day), chills, fevers, muscle aches, soreness with eye movement, headache, and a dry, hacking cough.  Home care  Follow these guidelines when caring for yourself at home:  · Avoid being around cigarette smoke, whether yours or other peoples.  · Acetaminophen or ibuprofen will help ease your fever, muscle aches, and headache. Dont give aspirin to anyone younger than 18 who has the flu. Aspirin can harm the liver.  · Nausea and loss of appetite are common with the flu. Eat light meals. Drink 6 to 8 glasses of liquids every day. Good choices are water, sport drinks, soft drinks without caffeine, juices, tea, and soup. Extra fluids will also help loosen secretions in your nose and lungs.  · Over-the-counter cold medicines will not make the flu go away faster. But the medicines may help with coughing, sore throat, and congestion in your nose and sinuses. Dont use a decongestant if you have high blood pressure.  · Stay home until your fever has been gone for at least 24 hours without using medicine to reduce fever.  Follow-up care  Follow up with your healthcare provider, or as advised, if you are not getting better over the next week.  If you are 65 or older, talk with your provider about getting a pneumococcal vaccine  every 5 years. You should also get this vaccine if you have chronic asthma or COPD. All adults should get a flu vaccine every fall. Ask your provider about this.  When to seek medical advice  Call your healthcare provider right away if any of these occur:  · Cough with lots of colored sputum (mucus) or blood in your sputum  · Chest pain, shortness of breath, wheezing, or difficulty breathing  · Severe headache, or face, neck, or ear pain  · New rash with fever  · Fever of 100.4°F (38°C) or higher, or as directed by your healthcare provider  · Confusion, behavior change, or seizure  · Severe weakness or dizziness  · You get a fever or cough after getting better for a few days  Date Last Reviewed: 12/23/2014  © 5319-7335 [employer]. [address] [place] [address], [place] [address]. All rights reserved. This information is not intended as a substitute for professional medical care. Always follow your healthcare professional's instructions.

## 2017-03-07 NOTE — PLAN OF CARE
DANIEL spoke to Millicent, daughter.  Nebulizer will not be covered because patient does not have a qualifying diagnosis.  It will be an out of pocket expense.  Millicent asked that I contact [facility] about the prescriptions @[phone].  DANIEL spoke to Pamella at [facility].  Prescriptions were transferred to [facility] because they can not bill [facility] for the nebulizer medications.  DANIEL called Millicent back.  She will get nebulizer and medications from [facility].

## 2017-03-09 ENCOUNTER — OFFICE VISIT (OUTPATIENT)
Dept: INTERNAL MEDICINE | Facility: CLINIC | Age: 82
End: 2017-03-09
Payer: MEDICARE

## 2017-03-09 ENCOUNTER — OFFICE VISIT (OUTPATIENT)
Dept: PULMONOLOGY | Facility: CLINIC | Age: 82
End: 2017-03-09
Payer: MEDICARE

## 2017-03-09 ENCOUNTER — TELEPHONE (OUTPATIENT)
Dept: INTERNAL MEDICINE | Facility: CLINIC | Age: 82
End: 2017-03-09

## 2017-03-09 ENCOUNTER — OUTPATIENT CASE MANAGEMENT (OUTPATIENT)
Dept: ADMINISTRATIVE | Facility: OTHER | Age: 82
End: 2017-03-09

## 2017-03-09 VITALS
SYSTOLIC BLOOD PRESSURE: 142 MMHG | TEMPERATURE: 98 F | WEIGHT: 203.06 LBS | OXYGEN SATURATION: 94 % | HEART RATE: 81 BPM | HEIGHT: 72 IN | BODY MASS INDEX: 27.5 KG/M2 | DIASTOLIC BLOOD PRESSURE: 84 MMHG

## 2017-03-09 VITALS
HEART RATE: 81 BPM | SYSTOLIC BLOOD PRESSURE: 130 MMHG | RESPIRATION RATE: 16 BRPM | HEIGHT: 72 IN | WEIGHT: 203 LBS | DIASTOLIC BLOOD PRESSURE: 80 MMHG | BODY MASS INDEX: 27.5 KG/M2 | OXYGEN SATURATION: 95 %

## 2017-03-09 DIAGNOSIS — J96.01 ACUTE HYPOXEMIC RESPIRATORY FAILURE: ICD-10-CM

## 2017-03-09 DIAGNOSIS — Z86.711 HISTORY OF PULMONARY EMBOLISM: ICD-10-CM

## 2017-03-09 DIAGNOSIS — R06.00 DYSPNEA AND RESPIRATORY ABNORMALITIES: Primary | ICD-10-CM

## 2017-03-09 DIAGNOSIS — J84.9 CHRONIC INTERSTITIAL LUNG DISEASE: ICD-10-CM

## 2017-03-09 DIAGNOSIS — J84.9 INTERSTITIAL LUNG DISEASE: ICD-10-CM

## 2017-03-09 DIAGNOSIS — R53.1 GENERALIZED WEAKNESS: ICD-10-CM

## 2017-03-09 DIAGNOSIS — J10.1 INFLUENZA A: ICD-10-CM

## 2017-03-09 DIAGNOSIS — R09.02 HYPOXIA: Primary | ICD-10-CM

## 2017-03-09 DIAGNOSIS — J90 PLEURAL EFFUSION, LEFT: ICD-10-CM

## 2017-03-09 DIAGNOSIS — J44.9 CHRONIC OBSTRUCTIVE PULMONARY DISEASE, UNSPECIFIED COPD TYPE: ICD-10-CM

## 2017-03-09 DIAGNOSIS — R06.89 DYSPNEA AND RESPIRATORY ABNORMALITIES: Primary | ICD-10-CM

## 2017-03-09 DIAGNOSIS — R09.02 EXERCISE HYPOXEMIA: ICD-10-CM

## 2017-03-09 DIAGNOSIS — E11.9 TYPE 2 DIABETES MELLITUS WITHOUT COMPLICATION, WITHOUT LONG-TERM CURRENT USE OF INSULIN: Primary | ICD-10-CM

## 2017-03-09 LAB — INR PPP: 2.38

## 2017-03-09 PROCEDURE — 99999 PR PBB SHADOW E&M-EST. PATIENT-LVL IV: CPT | Mod: PBBFAC,,, | Performed by: NURSE PRACTITIONER

## 2017-03-09 PROCEDURE — 99999 PR PBB SHADOW E&M-EST. PATIENT-LVL III: CPT | Mod: PBBFAC,,, | Performed by: FAMILY MEDICINE

## 2017-03-09 PROCEDURE — 99215 OFFICE O/P EST HI 40 MIN: CPT | Mod: S$PBB,,, | Performed by: NURSE PRACTITIONER

## 2017-03-09 PROCEDURE — 99214 OFFICE O/P EST MOD 30 MIN: CPT | Mod: PBBFAC | Performed by: NURSE PRACTITIONER

## 2017-03-09 NOTE — LETTER
March 9, 2017      Carmelita Sanchez MD  [address] [place] [address] [address]ila [address] [id number]           [facility]  [address] [place] [address] [place]  [place] [address]  Phone: [phone]  Fax: [phone]          Patient: Todd Lee   MR Number: [id number]   YOB: 1931   Date of Visit: 3/9/2017       Dear Dr. Carmelita Sanchez:    Thank you for referring Todd Lee to me for evaluation. Attached you will find relevant portions of my assessment and plan of care.    If you have questions, please do not hesitate to call me. I look forward to following Todd Lee along with you.    Sincerely,    Samantha Warren, NP    Enclosure  CC:  No Recipients    If you would like to receive this communication electronically, please contact [email or web address] or [phone] to request more information on [facility] access.    For providers and/or their staff who would like to refer a patient to [facility], please contact us through our one-stop-shop provider referral line, Kavita Bernard, at [phone].    If you feel you have received this communication in error or would no longer like to receive these types of communications, please e-mail [email or web address]

## 2017-03-09 NOTE — PROGRESS NOTES
New patient    Subjective:      Patient ID: Todd Lee is a 85 y.o. male.    Patient Active Problem List   Diagnosis    Type 2 diabetes mellitus without complication, without long-term current use of insulin    Hyperlipidemia    Hypertension    Osteoarthritis    Elevated PSA    Pulmonary embolus    Hypoxia    Blood clotting disorder    Influenza A    Acute hypoxemic respiratory failure       Problem list has been reviewed.    he has been referred by Carmelita Sanchez MD for evaluation and management for   Chief Complaint   Patient presents with    Hypoxia     requiring home oxygen    Hospital Follow Up     dx with flu        Chief Complaint: Hypoxia (requiring home oxygen) and Hospital Follow Up (dx with flu )      HPI:  Patient reports to pulmonary clinic with his daughter, Millicent after a follow up hospital visit with Dr. Sanchez today.   Daughter reports they are reporting to pulmonary clinic related to a diagnosis of hypoxemia requiring home oxygen and she needs new order or to determine how to have oxygen covered by his Medicare insurance. They are presently paying out of pocket for the oxygen that was delivered on 3/4/2017.   Patient was set up with orders written on discharge on 3/4/2017 by Dr. Roberto Carlos Potter.  DME company: [employer], contact Ashwini. Phone: [phone]. Fax: [phone].   It was determined in order to bill patient's medicare the [employer] company needed a qualifying diagnosis which was provided: Acute hypoxemic respiratory failure and a written signature by Dr. Potter which was arranged to be obtained by fax.     Patient daughter reports patient is significantly improved from when he presented to [facility] on 3/1/2017 when diagnosed with influenza A with Acute hypoxemic respiratory failure.   Current respiratory medication: albuterol nebs 2-3 times daily. No wheezing has been noted, mild intermittent non productive cough.   There is history of intermittent  "wheezing long standing with exertion. Never on respiratory medication in past.     Per discharge summary from hospital course 3/1/2017 - 3/4/2017.   Todd Lee is a 85 y.o. Male with a PMH of DM, HTN, HLD, clotting disorder who was brought in by family for generalized weakness which began several days ago. Associated symptoms include a productive cough with yellow sputum, nausea, vomiting and decreased PO intake. The family reports that the patient recently suffered a fall at home resulting in two compression fractures and reports that the patient "has not been the same since". The family at bedside report that the patient has not been eating or drinking "much" in the past few days. The patient denies any modifying factors. Patient denies fever, chills, CP, cough, mcclellan, post nasal drip, orthopnea, palpitations, diaphoresis, headache, blurred vision, numbness, tingling, dizziness, localized weakness, abdominal pain, blood in stools, melena, hematemesis, urinary frequency, urgency, dysuria or hematuria. Family reports that the patient currently lives at home alone. CTA was negative for PE.    Hospital Course:   Pt admitted for Infleunza A and treated with Tamiflu. CXR did not show any Pneumonia. He responded to treatment well and all his symptoms have significantly improved. He had mild resp failure with hypoxemia on admission and his PO2 was 59. He was treated with O2 and nebs. He is doing very well and now wants to go home. His daughter also notes that he looks much better. He lives at home alone and is fairly independent and has a lady friend who helps him also with his ADLs. He also qualified for home O2 as his Sats dropped to 88% on RA w exercise. He was seen and examined today and deemed stable to be discharged home today with Home Health and Home O2 and needs Tamiflu for another 3 days.       Previous Report Reviewed: ER records, lab reports, office notes and radiology reports     Past Medical " History: The following portions of the patient's history were reviewed and updated as appropriate:   He  has a past surgical history that includes left inguinal hernia repair and Colonoscopy.  His family history includes Cancer in his father; Stroke in his mother.  He  reports that he has never smoked. He has never used smokeless tobacco. He reports that he does not drink alcohol or use illicit drugs.  He has a current medication list which includes the following prescription(s): acetaminophen-codeine 300-30mg, albuterol, atorvastatin, felodipine, fluticasone, hydrocodone-acetaminophen 10-325mg, meclizine, oseltamivir, and warfarin.  He is allergic to no known drug allergies..    Review of Systems   Constitutional: Negative for fever, chills, weight loss, weight gain, activity change, appetite change, fatigue and night sweats.   HENT: Negative for postnasal drip, rhinorrhea, sinus pressure, voice change and congestion.    Eyes: Negative for redness and itching.   Respiratory: Positive for shortness of breath (with exertion). Negative for snoring, cough, sputum production, chest tightness, wheezing, orthopnea, asthma nighttime symptoms, dyspnea on extertion, use of rescue inhaler and somnolence.    Cardiovascular: Negative for chest pain, palpitations and leg swelling.   Genitourinary: Negative for difficulty urinating and hematuria.   Endocrine: Negative for polydipsia, polyphagia, cold intolerance, heat intolerance and polyuria.    Musculoskeletal: Negative for arthralgias, gait problem, joint swelling and myalgias.   Skin: Negative.    Gastrointestinal: Negative for nausea, vomiting, abdominal pain and acid reflux.   Neurological: Negative for dizziness, weakness, light-headedness and headaches.   Hematological: Negative for adenopathy. No excessive bruising.   Psychiatric/Behavioral: Negative for sleep disturbance.        Objective:     Physical Exam   Constitutional: He is oriented to person, place, and time.  Vital signs are normal. He appears well-developed and well-nourished. He is active and cooperative.  Non-toxic appearance. No distress.   HENT:   Head: Normocephalic and atraumatic.   Right Ear: External ear normal.   Left Ear: External ear normal.   Nose: Nose normal.   Mouth/Throat: Oropharynx is clear and moist. No oropharyngeal exudate.   Eyes: Conjunctivae are normal.   Cardiovascular: Normal rate, regular rhythm, normal heart sounds and intact distal pulses.    Pulmonary/Chest: Effort normal and breath sounds normal. No stridor. He has no wheezes. He has no rales.   Abdominal: Soft. Bowel sounds are normal.   Musculoskeletal: Normal range of motion.   Neurological: He is alert and oriented to person, place, and time. He has normal reflexes.   Skin: Skin is warm and dry.   Psychiatric: He has a normal mood and affect. His behavior is normal. Judgment and thought content normal.   Vitals reviewed.    Vitals:    03/09/17 1443   BP: 130/80   Pulse: 81   Resp: 16   SpO2: 95%   Weight: 92.1 kg (203 lb)   Height: 6' (1.829 m)   PainSc: 0-No pain     Estimated body mass index is 27.53 kg/(m^2) as calculated from the following:    Height as of this encounter: 6' (1.829 m).    Weight as of this encounter: 92.1 kg (203 lb).    Personal Diagnostic Review    CT chest angio 3/1/2017  Impression     1. Patient respiratory motion causes artifactual filling defects within several bilateral lower lobe pulmonary arteries. No evidence of pulmonary embolism in the remaining pulmonary arteries.    2. Mild dilatation of the proximal descending thoracic aorta measuring up to 3.8 cm.  3.  Chronic subpleural interstitial thickening possibly related to mild chronic interstitial lung disease.  No acute appearing infiltrates identified.       Chest xray 3/1/2017  XR CHEST AP PORTABLE    Clinical history: shortness of breath.      Findings: Heart size is within normal limits for AP technique.  Stable aortic atherosclerosis and tortuosity.  There is stable mild chronic coarsening of the interstitial markings.  There is mild blunting of the left costophrenic angle suggesting a small pleural effusion. There is mild adjacent discoid atelectasis in the left lung base.  The remainder of the lungs appear clear of active disease.   Impression    Possible trace left pleural effusion with mild adjacent left basilar discoid atelectasis.  Stable mild chronic interstitial thickening.           Lab Review   Admission on 03/01/2017, Discharged on 03/05/2017   Component Date Value    WBC 03/01/2017 11.11     RBC 03/01/2017 5.21     Hemoglobin 03/01/2017 15.8     Hematocrit 03/01/2017 46.2     MCV 03/01/2017 89     MCH 03/01/2017 30.3     MCHC 03/01/2017 34.2     RDW 03/01/2017 13.6     Platelets 03/01/2017 225     MPV 03/01/2017 9.1*    Gran # 03/01/2017 9.7*    Lymph # 03/01/2017 0.6*    Mono # 03/01/2017 0.7     Eos # 03/01/2017 0.0     Baso # 03/01/2017 0.03     Gran% 03/01/2017 87.5*    Lymph% 03/01/2017 5.6*    Mono% 03/01/2017 6.6     Eosinophil% 03/01/2017 0.0     Basophil% 03/01/2017 0.3     Differential Method 03/01/2017 Automated     Sodium 03/01/2017 138     Potassium 03/01/2017 4.2     Chloride 03/01/2017 105     CO2 03/01/2017 21*    Glucose 03/01/2017 231*    BUN, Bld 03/01/2017 14     Creatinine 03/01/2017 1.0     Calcium 03/01/2017 9.3     Total Protein 03/01/2017 7.9     Albumin 03/01/2017 3.7     Total Bilirubin 03/01/2017 1.2*    Alkaline Phosphatase 03/01/2017 120     AST 03/01/2017 21     ALT 03/01/2017 28     Anion Gap 03/01/2017 12     eGFR if African American 03/01/2017 >60     eGFR if non  Amer* 03/01/2017 >60     Specimen UA 03/01/2017 Urine, Clean Catch     Color, UA 03/01/2017 Yellow     Appearance, UA 03/01/2017 Clear     pH, UA 03/01/2017 6.0     Specific Gravity, UA 03/01/2017 1.010     Protein, UA 03/01/2017 Negative     Glucose, UA 03/01/2017 1+*    Ketones, UA 03/01/2017 1+*     Bilirubin (UA) 03/01/2017 Negative     Occult Blood UA 03/01/2017 Trace*    Nitrite, UA 03/01/2017 Negative     Urobilinogen, UA 03/01/2017 1.0     Leukocytes, UA 03/01/2017 Negative     BNP 03/01/2017 71     CPK 03/01/2017 52     Troponin I 03/01/2017 0.013     aPTT 03/01/2017 28.5     Prothrombin Time 03/01/2017 14.2*    INR 03/01/2017 1.4*    POC PH 03/01/2017 7.410     POC PCO2 03/01/2017 34.9*    POC PO2 03/01/2017 59*    POC HCO3 03/01/2017 22.1*    POC BE 03/01/2017 -3     POC SATURATED O2 03/01/2017 90*    Sample 03/01/2017 ARTERIAL     Site 03/01/2017 LR     Allens Test 03/01/2017 Pass     DelSys 03/01/2017 Room Air     Mode 03/01/2017 SPONT     FiO2 03/01/2017 21     Blood Culture, Routine 03/01/2017 No growth after 5 days.     Blood Culture, Routine 03/01/2017 No growth after 5 days.     EF 03/02/2017 55     Diastolic Dysfunction 03/02/2017 No     Est. PA Systolic Pressure 03/02/2017 18.32     Prothrombin Time 03/01/2017 14.5*    INR 03/01/2017 1.4*    Influenza A Ag, EIA 03/01/2017 Positive*    Influenza B Ag, EIA 03/01/2017 Negative     Flu A & B Source 03/01/2017 Nasal Swab     Sodium 03/02/2017 139     Potassium 03/02/2017 3.7     Chloride 03/02/2017 103     CO2 03/02/2017 26     Glucose 03/02/2017 227*    BUN, Bld 03/02/2017 16     Creatinine 03/02/2017 1.1     Calcium 03/02/2017 8.6*    Total Protein 03/02/2017 7.1     Albumin 03/02/2017 3.3*    Total Bilirubin 03/02/2017 0.7     Alkaline Phosphatase 03/02/2017 98     AST 03/02/2017 18     ALT 03/02/2017 23     Anion Gap 03/02/2017 10     eGFR if African American 03/02/2017 >60     eGFR if non African Amer* 03/02/2017 >60     Magnesium 03/02/2017 2.0     Phosphorus 03/02/2017 3.4     WBC 03/02/2017 12.53     RBC 03/02/2017 4.85     Hemoglobin 03/02/2017 14.4     Hematocrit 03/02/2017 43.9     MCV 03/02/2017 91     MCH 03/02/2017 29.7     MCHC 03/02/2017 32.8     RDW 03/02/2017 13.9      Platelets 03/02/2017 231     MPV 03/02/2017 9.4     Gran # 03/02/2017 10.3*    Lymph # 03/02/2017 1.1     Mono # 03/02/2017 1.0     Eos # 03/02/2017 0.0     Baso # 03/02/2017 0.01     Gran% 03/02/2017 82.5*    Lymph% 03/02/2017 9.1*    Mono% 03/02/2017 8.3     Eosinophil% 03/02/2017 0.0     Basophil% 03/02/2017 0.1     Differential Method 03/02/2017 Automated     Prothrombin Time 03/02/2017 14.3*    INR 03/02/2017 1.4*    POCT Glucose 03/02/2017 153*    POCT Glucose 03/02/2017 237*    Prothrombin Time 03/03/2017 17.6*    INR 03/03/2017 1.7*    WBC 03/03/2017 6.71     RBC 03/03/2017 4.68     Hemoglobin 03/03/2017 13.8*    Hematocrit 03/03/2017 42.9     MCV 03/03/2017 92     MCH 03/03/2017 29.5     MCHC 03/03/2017 32.2     RDW 03/03/2017 14.1     Platelets 03/03/2017 219     MPV 03/03/2017 9.6     Gran # 03/03/2017 4.8     Lymph # 03/03/2017 1.3     Mono # 03/03/2017 0.6     Eos # 03/03/2017 0.0     Baso # 03/03/2017 0.03     Gran% 03/03/2017 70.9     Lymph% 03/03/2017 19.2     Mono% 03/03/2017 9.1     Eosinophil% 03/03/2017 0.4     Basophil% 03/03/2017 0.4     Differential Method 03/03/2017 Automated     Sodium 03/03/2017 142     Potassium 03/03/2017 3.8     Chloride 03/03/2017 107     CO2 03/03/2017 27     Glucose 03/03/2017 140*    BUN, Bld 03/03/2017 20     Creatinine 03/03/2017 1.0     Calcium 03/03/2017 8.4*    Anion Gap 03/03/2017 8     eGFR if African American 03/03/2017 >60     eGFR if non African Amer* 03/03/2017 >60     POCT Glucose 03/02/2017 165*    POCT Glucose 03/03/2017 138*    POCT Glucose 03/03/2017 216*    POCT Glucose 03/03/2017 200*    POCT Glucose 03/03/2017 141*    Prothrombin Time 03/04/2017 19.6*    INR 03/04/2017 1.9*    POCT Glucose 03/04/2017 166*    POCT Glucose 03/04/2017 208*    POCT Glucose 03/04/2017 175*    Prothrombin Time 03/05/2017 22.0*    INR 03/05/2017 2.2*    POCT Glucose 03/05/2017 148*    POCT Glucose 03/05/2017  171*   Anti-coag visit on 02/23/2017   Component Date Value    INR 02/23/2017 3.7*   Admission on 02/20/2017, Discharged on 02/20/2017   Component Date Value    Prothrombin Time 02/20/2017 32.3*    INR 02/20/2017 3.3*    Specimen UA 02/20/2017 Urine, Clean Catch     Color, UA 02/20/2017 Yellow     Appearance, UA 02/20/2017 Clear     pH, UA 02/20/2017 6.0     Specific Gravity, UA 02/20/2017 >=1.030*    Protein, UA 02/20/2017 Negative     Glucose, UA 02/20/2017 2+*    Ketones, UA 02/20/2017 Negative     Bilirubin (UA) 02/20/2017 Negative     Occult Blood UA 02/20/2017 Trace*    Nitrite, UA 02/20/2017 Negative     Urobilinogen, UA 02/20/2017 1.0     Leukocytes, UA 02/20/2017 Negative    Anti-coag visit on 01/26/2017   Component Date Value    INR 01/26/2017 2.2      Acceptab* 01/26/2017 Yes      Lab Results   Component Value Date     03/03/2017     03/02/2017     03/01/2017    K 3.8 03/03/2017    K 3.7 03/02/2017    K 4.2 03/01/2017    CO2 27 03/03/2017    CO2 26 03/02/2017    CO2 21 (L) 03/01/2017    BUN 20 03/03/2017    BUN 16 03/02/2017    BUN 14 03/01/2017    CREATININE 1.0 03/03/2017    CREATININE 1.1 03/02/2017    CREATININE 1.0 03/01/2017    CALCIUM 8.4 (L) 03/03/2017    CALCIUM 8.6 (L) 03/02/2017    CALCIUM 9.3 03/01/2017     Lab Results   Component Value Date    TROPONINI 0.013 03/01/2017     Lab Results   Component Value Date    WBC 6.71 03/03/2017    HGB 13.8 (L) 03/03/2017    HCT 42.9 03/03/2017    MCV 92 03/03/2017     03/03/2017        Assessment:     1. Dyspnea and respiratory abnormalities    2. Influenza A    3. Pleural effusion, left    4. Chronic interstitial lung disease, mild    5. Exercise hypoxemia    6. Acute hypoxemic respiratory failure    7. Chronic obstructive pulmonary disease       Orders Placed This Encounter   Procedures    X-Ray Chest PA And Lateral     Standing Status:   Future     Standing Expiration Date:   3/9/2018     Order  Specific Question:   Reason for Exam:     Answer:   small left pleural effusion    Six Minute Walk Test to qualify for Home Oxygen     Standing Status:   Future     Standing Expiration Date:   3/9/2018    Stress test, pulmonary     Standing Status:   Future     Standing Expiration Date:   3/9/2018    PULSE OXIMETRY OVERNIGHT     Order Specific Question:   Reason for Test?     Answer:   O2 Re-Qualification     Comments:   Room Air     Order Specific Question:   Symptoms:     Answer:   Daytime Fatigue     Comments:   please have daughter  day before planned appt to can use overnight and bring back for scheduled appt in 3 weeks.      Order Specific Question:   Symptoms:     Answer:   Snoring     Plan:     Discussed diagnosis, its evaluation, treatment and usual course. All questions answered.    Dyspnea and respiratory abnormalities  Comments:  improved. presents w/o oxygen in use. needs to requalify for home oxygen NC 2 lm script written on discharge, pt paying out of pocket presently.   Orders:  -     Six Minute Walk Test to qualify for Home Oxygen; Future  -     Stress test, pulmonary; Future; Expected date: 3/30/17  -     PULSE OXIMETRY OVERNIGHT    Influenza A  Comments:  significantly improved on tamiflu. complete 2 days of therapy left to take.   Orders:  -     X-Ray Chest PA And Lateral; Future; Expected date: 3/23/17    Pleural effusion, left  Comments:  trace efffusion left lower lung, pt is improved post hosp d/c. fu in 2 weeks for repeat cxr.   Orders:  -     X-Ray Chest PA And Lateral; Future; Expected date: 3/23/17  -     Six Minute Walk Test to qualify for Home Oxygen; Future    Chronic interstitial lung disease, mild  Comments:  stable, not on pulmonary meds prior to hospital dc w/ flu A with acute respiratory failure w/hypoemia. ILD Noted on cxr 3/1/2017.   Orders:  -     PULSE OXIMETRY OVERNIGHT    Exercise hypoxemia  Comments:  continue nc with exertion and sleep.   Orders:  -     Stress  test, pulmonary; Future; Expected date: 3/30/17    Acute hypoxemic respiratory failure  Comments:  improved after hospital discharge. Remains on home oxygen NC 2 l for exertion and sleep. overnight pulse oximetry, plan study in 3 wks, prior to rtc.  Orders:  -     PULSE OXIMETRY OVERNIGHT    Chronic obstructive pulmonary disease   Comments:  with dx chronic interstitual lung disease. evaluate need for continued oxygen at night. plan study in 3 wks just prior to rtc.   Orders:  -     PULSE OXIMETRY OVERNIGHT      MEDICAL DECISION MAKING: Moderate complexity.  Overall, the multiple problems listed are of moderate to high severity that may impact quality of life and activities of daily living. Side effects of medications, treatment plan as well as options and alternatives reviewed and discussed with patient. There was counseling of patient concerning these issues.    Total time spent in face to face counseling and coordination of care 60 minutes in face to face  discussion concerning diagnosis, prognosis, review of lab and test results, benefits of treatment as well as management of disease, counseling of patient and coordination of care between various health  care providers . Greater than half the time spent was used for coordination of care and counseling of patient. Discussion with other physicians or health care providers occurred.     Return in about 3 weeks (around 3/30/2017) for fu hypoxemia and trace lt pleural effusion post dx Flu A. w/review cxr,pul stress, over night oxi. .     Other regarding home oxygen:   Spoke with Mehnaz with hospitalist group at [facility] and she advised that it is customary for Dr Roberto Carlos Potter to sign the initial order and he would be happy to sign whatever dme company needed. Dr. Potter's fax was provided to Ashwini [facility] [employer] to send paper work needing Dr. Potter's signature in order for patient's dme company to bill patient's medicare insurance for oxygen ordered  and set up on 3/4/2017 by Dr. Potter.   From our office Dwaine faxed qualifying medical documentation requested to Ashwini with [facility]'s med supply.     Six minute walk to re qualify under my orders was attempted by MARY ALICE Isidro. O2 sat at rest on room air 95%. Patient had desaturation to 88% within one minute of walk with his cane.  Remainder of qualifying walk with placing oxygen was not completed. Patient reported feeling fatigued, a decision to defer walk until return to clinic was made.

## 2017-03-09 NOTE — PROGRESS NOTES
3/9/17 - Phone contact made with pt's daughter this AM. She advised pt is bed bound and too weak to get up to answer phone and requested that CM call her phone for the time being for pt contact. She is getting him ready for the appt he has today with Dr Sanchez and is unable to complete Initial Screen for [facility] at this time. She did report she has been able to get a nebulizer and the meds for it since his hospital d/c that they were having some difficulty with obtaining and pt has been using it with some improvement in symptoms of dyspnea. Questioned daughter whether pt is living with relatives in [facility] as EMR shows [place], [place] address, but he is being followed by [place] providers. She advised he is not living in the [facility], but driving from [place] to [place]. Advised CM [facility] [place].  Key Fink RN

## 2017-03-09 NOTE — MR AVS SNAPSHOT
[facility]  [address] [facility] [address]  Phone: [phone]  Fax: [phone]                  Todd Lee   3/9/2017 3:00 PM   Office Visit    Description:  Male : 1931   Provider:  Samantha Warren NP   Department:  [facility]           Reason for Visit     Hypoxia     Hospital Follow Up           Diagnoses this Visit        Comments    Pleural effusion, left    -  Primary trace efffusion left lower lung, pt is improved post hosp d/c. fu in 2 weeks for repeat cxr.     Influenza A     significantly improved on tamiflu has 2 days of therapy.     Dyspnea and respiratory abnormalities     improved. presents w/o oxygen in use. needs to requalify for home oxygen [place] 2 lm script written on discharge, pt paying out of pocket presently.     Chronic interstitial lung disease     stable, not on pulmonary meds prior to hospital dc w/ flu A with acute respiratory failure w/hypoemia. ILD Noted on cxr 3/1/2017.            To Do List           Future Appointments        Provider Department Dept Phone    3/16/2017 11:00 AM Kesha Killian, PharmD [facility] [phone]    4/3/2017 10:15 AM ONLH XR1- [facility] [phone]    4/3/2017 10:30 AM PULMONARY LAB, [facility] [phone]    4/3/2017 11:00 AM Samantha Warren NP [facility] [phone]    2017 8:30 AM Jalil Sorto OD [facility] [phone]      Goals (5 Years of Data)     None      Follow-Up and Disposition     Return in about 2 weeks (around 3/23/2017) for fu trace lt pleural effusion post dx Flu A w/ acute resp. failure. .      [facility]     [facility] -  Assistance  Registered nurses in the [facility] provide clinical advisement, health education, appointment booking, and other advisory services.  Call for this free service at [phone].              Medications           Message regarding Medications     Verify the changes and/or additions to your medication regime listed below are the same as discussed with your clinician today.  If any of these changes or additions are incorrect, please notify your healthcare provider.             Verify that the below list of medications is an accurate representation of the medications you are currently taking.  If none reported, the list may be blank. If incorrect, please contact your healthcare provider. Carry this list with you in case of emergency.           Current Medications     acetaminophen-codeine 300-30mg (TYLENOL #3) 300-30 mg Tab Take 1 tablet by mouth every 8 (eight) hours as needed.    albuterol (PROVENTIL) 2.5 mg /3 mL (0.083 %) nebulizer solution Take 3 mLs (2.5 mg total) by nebulization every 6 (six) hours while awake. Rescue    atorvastatin (LIPITOR) 10 MG tablet TAKE 1 TABLET EVERY DAY    felodipine (PLENDIL) 2.5 MG Tb24 TAKE 1 TABLET EVERY DAY    fluticasone (FLONASE) 50 mcg/actuation nasal spray 1 spray by Each Nare route once daily.    hydrocodone-acetaminophen 10-325mg (NORCO)  mg Tab Take 1 tablet by mouth every 4 (four) hours as needed.    meclizine (ANTIVERT) 25 mg tablet Take 1 tablet (25 mg total) by mouth 3 (three) times daily as needed.    oseltamivir (TAMIFLU) 75 MG capsule Take 1 capsule (75 mg total) by mouth 2 (two) times daily.    warfarin (COUMADIN) 7.5 MG tablet Take 1/2 tablet onand Fridays and 1 tablet all remaining days as directed by the coumadin clinic.           Clinical Reference Information           Your Vitals Were     BP Pulse Resp Height Weight SpO2    130/80 (BP Location: Right arm, Patient Position: Sitting, BP Method: Manual) 81 16 6' (1.829 m) 92.1 kg (203 lb) 92%    BMI                27.53 kg/m2          Blood Pressure          Most Recent Value    BP  130/80      Allergies as of 3/9/2017     No Known Drug Allergies      Immunizations Administered on Date  of Encounter - 3/9/2017     None      Orders Placed During Today's Visit     Future Labs/Procedures Expected by Expires    X-Ray Chest PA And Lateral  3/23/2017 3/9/2018    Six Minute Walk Test to qualify for Home Oxygen  As directed 3/9/2018      [facility] Sign-Up     Activating your [facility] account is as easy as 1-2-3!     1) Visit [email or web address], select Sign Up Now, enter this activation code and your date of birth, then select Next.  [email or web address]  Expires: 3/12/2017 10:18 AM      2) Create a username and password to use when you visit [facility] in the future and select a security question in case you lose your password and select Next.    3) Enter your e-mail address and click Sign Up!    Additional Information  If you have questions, please e-mail [email or web address] or call [phone] to talk to our [facility] staff. Remember, [facility] is NOT to be used for urgent needs. For medical emergencies, dial 911.         Language Assistance Services     ATTENTION: Language assistance services are available, free of charge. Please call [phone].      ATENCIÓN: Si habla español, tiene a livingston disposición servicios gratuitos de asistencia lingüística. Llame al [phone].     CHÚ Ý: N?u b?n nói Ti?ng Vi?t, có các d?ch v? h? tr? ngôn ng? mi?n phí dành cho b?n. G?i s? [phone].         [facility] complies with applicable Federal civil rights laws and does not discriminate on the basis of race, color, national origin, age, disability, or sex.

## 2017-03-09 NOTE — PROGRESS NOTES
Transitional Care Note  Subjective:       Patient ID: Todd Lee is a 85 y.o. male.  Chief Complaint: Hospital Follow Up    Family and/or Caretaker present at visit?  Yes.  Diagnostic tests reviewed/disposition: No diagnosic tests pending after this hospitalization.  Disease/illness education: see A/P  Home health/community services discussion/referrals: Patient has home health established at [facility].   Establishment or re-establishment of referral orders for community resources: No other necessary community resources.   Discussion with other health care providers: scheduled to see Pulmonology today for further evaluation of hypoxia and interstitial lung disease on CT.   HPI Comments: Patient presents to clinic today with his daughter for TCC/hospital follow up. Patient recently admitted for hypoxia. Also treated for influenza A. Daughter reports patient desats to <88% with walking and did not qualify for oxygen per insurance due to diagnosis. She reports they were sent home with 2 oxygen tanks that she needs to return, but is concerned that he needs oxygen. She also reports patient has been very weak and sleeps a lot. Patient reports symptoms stable, not worse. Denies fever/chills. Breathing stable. Pt also requests handicap tag. Patient is otherwise without concerns today.      Review of Systems   Constitutional: Positive for fatigue. Negative for chills, fever and unexpected weight change.   Eyes: Negative for visual disturbance.   Respiratory: Positive for cough and shortness of breath.    Cardiovascular: Negative for chest pain.   Musculoskeletal: Negative for myalgias.   Neurological: Negative for headaches.       Objective:      Physical Exam   Constitutional: He is oriented to person, place, and time. He appears well-developed and well-nourished. No distress.   HENT:   Head: Normocephalic and atraumatic.   Eyes: Conjunctivae and EOM are normal. Pupils are equal, round, and reactive  to light. No scleral icterus.   Cardiovascular: Normal rate and regular rhythm.  Exam reveals no gallop and no friction rub.    No murmur heard.  Pulmonary/Chest: Effort normal and breath sounds normal.   Neurological: He is alert and oriented to person, place, and time. No cranial nerve deficit. Gait normal.   Psychiatric: He has a normal mood and affect.   Vitals reviewed.      Assessment:       1. Hypoxia    2. Interstitial lung disease    3. Influenza A    4. History of pulmonary embolism    5. Generalized weakness        Plan:       Todd was seen today for hospital follow up.    Diagnoses and all orders for this visit:    Hypoxia  Comments:  94% today, sent home with oxygen, daughter reports pt desats to <88% with walking, reports insurance will not cover O2 for hypoxia  Orders:  -     Ambulatory Referral to Pulmonology    Interstitial lung disease  Comments:  will see pulmonology today for further evaluation  Orders:  -     Ambulatory Referral to Pulmonology    Influenza A  Comments:  finish course of tamiflu, follow up if worse    History of pulmonary embolism  Comments:  on chronic coumadin, followed by coumadin clinic    Generalized weakness  Comments:  will have home health PT assist with strengthening program

## 2017-03-09 NOTE — TELEPHONE ENCOUNTER
Please contact [facility]. Please give verbal order for PT to address generalized weakness/deconditioning. Please let me know if there are any questions.

## 2017-03-10 ENCOUNTER — TELEPHONE (OUTPATIENT)
Dept: PULMONOLOGY | Facility: CLINIC | Age: 82
End: 2017-03-10

## 2017-03-10 RX ORDER — METFORMIN HYDROCHLORIDE 500 MG/1
500 TABLET ORAL 2 TIMES DAILY WITH MEALS
Qty: 180 TABLET | Refills: 1 | Status: SHIPPED | OUTPATIENT
Start: 2017-03-10 | End: 2017-04-04

## 2017-03-10 NOTE — TELEPHONE ENCOUNTER
Spoke with mario, she stated that she was letting the office know that she receive the patient paperwork and waiting on a CME from another doctor.

## 2017-03-10 NOTE — TELEPHONE ENCOUNTER
----- Message from Tracie Schultz sent at 3/9/2017  4:47 PM CST -----  Contact: Ashwini with [facility]  [phone]  Re pt's oxygen and the documentation they rec'd toiday

## 2017-03-13 ENCOUNTER — ANTI-COAG VISIT (OUTPATIENT)
Dept: CARDIOLOGY | Facility: CLINIC | Age: 82
End: 2017-03-13

## 2017-03-13 DIAGNOSIS — Z79.01 LONG TERM (CURRENT) USE OF ANTICOAGULANTS: Primary | ICD-10-CM

## 2017-03-13 RX ORDER — WARFARIN 7.5 MG/1
TABLET ORAL
Qty: 30 TABLET | Refills: 3 | Status: SHIPPED | OUTPATIENT
Start: 2017-03-13 | End: 2017-07-19 | Stop reason: SDUPTHER

## 2017-03-13 NOTE — PROGRESS NOTES
Verbal result taken from [facility] PT/INR __27.3/2.38_____ Date drawn___3/9_____   Recently admitted for hypoxia and influenza, discharged on tamiflu and albuterol. Continue current dose. Repeat INR in 1 week. Orders faxed.

## 2017-03-15 ENCOUNTER — TELEPHONE (OUTPATIENT)
Dept: INTERNAL MEDICINE | Facility: CLINIC | Age: 82
End: 2017-03-15

## 2017-03-15 NOTE — TELEPHONE ENCOUNTER
----- Message from Lorrie Ocampo sent at 3/15/2017  3:20 PM CDT -----  Contact: isela Ricks  Calling concerning orders for patient to reside in skilled nursing home facility . Please call Millicent @ [phone].  For orders to be fax call [phone] attn: Hilaria. Thanks, eusebia

## 2017-03-16 ENCOUNTER — OUTPATIENT CASE MANAGEMENT (OUTPATIENT)
Dept: ADMINISTRATIVE | Facility: OTHER | Age: 82
End: 2017-03-16

## 2017-03-16 NOTE — TELEPHONE ENCOUNTER
----- Message from Nathanael Hernandez sent at 3/16/2017  1:46 PM CDT -----  Contact: Fewvdpbn-Xyutf-[phone]  Would like orders for nursing home and a TB scan, would like to consult with the nurse.  Please call back @ [phone]. Thanks-[facility]

## 2017-03-16 NOTE — PROGRESS NOTES
03/16/17-Called and was told that patient and daughter are not available at this time. Will follow up with patient/caregiver at a later time. 1'st attempt to screen patient.

## 2017-03-16 NOTE — TELEPHONE ENCOUNTER
Typically they require a physical and TB skin test, which would require office visit. Does she have paperwork?

## 2017-03-17 ENCOUNTER — NURSE TRIAGE (OUTPATIENT)
Dept: ADMINISTRATIVE | Facility: CLINIC | Age: 82
End: 2017-03-17

## 2017-03-17 DIAGNOSIS — E11.9 TYPE 2 DIABETES MELLITUS WITHOUT COMPLICATION: ICD-10-CM

## 2017-03-17 LAB — INR PPP: 4.3

## 2017-03-17 NOTE — TELEPHONE ENCOUNTER
----- Message from Eboni Prince sent at 3/17/2017 11:54 AM CDT -----  Pt daughter(Millicent) at [phone]//states they are trying to get Nursing Home Placement for pt and need orders from the ////fax is [phone]//[facility]//Attn: Lisa//please call if any questions//also pt is needing a TB test//thanks/alivia

## 2017-03-17 NOTE — TELEPHONE ENCOUNTER
INR 4.3 per HH nurse 2pm. Tried to call dr albarran and coumadin clinic without response. Denies any bleeding. No coumadin so far today. Spoke with MD on call - No coumadin tonight. 1/2 pill sat/sun. Talk to miki on Monday. Notified family. Call back with questions.     Reason for Disposition   Caller has URGENT medication question about med that PCP prescribed and triager unable to answer question    Protocols used: ST MEDICATION QUESTION CALL-A-AH

## 2017-03-20 ENCOUNTER — OUTPATIENT CASE MANAGEMENT (OUTPATIENT)
Dept: ADMINISTRATIVE | Facility: OTHER | Age: 82
End: 2017-03-20

## 2017-03-20 ENCOUNTER — ANTI-COAG VISIT (OUTPATIENT)
Dept: CARDIOLOGY | Facility: CLINIC | Age: 82
End: 2017-03-20

## 2017-03-20 NOTE — PROGRESS NOTES
03/20/17-Called and left message with patient's caregiver to call back. 2'nd attempt to screen patient.

## 2017-03-20 NOTE — PROGRESS NOTES
Faxed result taken from __[facility] PT/INR __4.3_____ Date drawn___3/17_____ Faxed was received after hours, patient was instructed by on-call doctors to hold 1 dose and lowered dose over the weekend. Will lower tonight's dose and repeat INR on 3/21. Orders faxed.

## 2017-03-22 ENCOUNTER — ANTI-COAG VISIT (OUTPATIENT)
Dept: CARDIOLOGY | Facility: CLINIC | Age: 82
End: 2017-03-22

## 2017-03-22 ENCOUNTER — TELEPHONE (OUTPATIENT)
Dept: INTERNAL MEDICINE | Facility: CLINIC | Age: 82
End: 2017-03-22

## 2017-03-22 LAB — INR PPP: 1.8

## 2017-03-22 NOTE — TELEPHONE ENCOUNTER
----- Message from Diane Rodriguez sent at 3/22/2017 10:23 AM CDT -----  Contact: [facility]  Annabella called to check the status of some paperwork that they faxed over. She can be contacted at [phone].    Thanks,  Diane

## 2017-03-22 NOTE — PROGRESS NOTES
Faxed result taken from __[facility]__. PT/INR __22.1/1.8_____ Date drawn___3/22____ Begin 3.75mg on Mondays, Wednesdays, Fridays and 7.5mg all other days. Orders faxed.

## 2017-03-23 ENCOUNTER — OUTPATIENT CASE MANAGEMENT (OUTPATIENT)
Dept: ADMINISTRATIVE | Facility: OTHER | Age: 82
End: 2017-03-23

## 2017-03-23 NOTE — PROGRESS NOTES
03/23/17-Called and spoke to Todd Lee, 85 year old male. Patient states that his daughter Millicent handles everything and I would need to speak to her but she is having kidney stone surgery today. Call her tomorrow in the afternoon. 3'rd attempt to screen patient. Will call daughter tomorrow afternoon for screen.

## 2017-03-23 NOTE — TELEPHONE ENCOUNTER
----- Message from Erika Quevedo sent at 3/22/2017  3:08 PM CDT -----  Contact: daughter/Millicent Lee  States he needs the TB sign and symptoms form signed by Dr Sanchez and faxed to   [phone]. Please call Millicent Lee at [phone]. Thank you

## 2017-03-23 NOTE — TELEPHONE ENCOUNTER
----- Message from Tracie Schultz sent at 3/23/2017  8:13 AM CDT -----  Contact: Valerie with [facility]  [phone]  fax #[phone]  Pt has been trying to be admitted for inpatient rehab since early last week because he was not doing well enough at home since his discharge from [facility] with home health.  The Home Health agency apparently did fax over a request last week but it is not clear what number they sent it to and if it went to Dr Sanchez or Dr Forman.  Pt's situation is deteriorating daily and his family really needs help for him.  Ms Padilla will fax a new request now to Dr Sanchez's attn and would like to admit pt today if at all possible.  Please contact Ms Padilla ASAP at the above number with any questions and to let her know if this will be possible.

## 2017-03-24 ENCOUNTER — OUTPATIENT CASE MANAGEMENT (OUTPATIENT)
Dept: ADMINISTRATIVE | Facility: OTHER | Age: 82
End: 2017-03-24

## 2017-03-24 ENCOUNTER — TELEPHONE (OUTPATIENT)
Dept: INTERNAL MEDICINE | Facility: CLINIC | Age: 82
End: 2017-03-24

## 2017-03-24 NOTE — PROGRESS NOTES
03/24/17- Called and spoke to patient's daughter Millicent. They are trying to get patient placed at [facility] for 24 hours or more. When patient is discharged from the nursing home, he can receive alternate care help thru his insurance. It is a benefit  of his insurance sitter to site per daughter. [facility] just needs the TB test. Called and spoke to Valerie at [facility] all they need is the TB faxed to them. Valerie talked to a nurse yesterday and they were going to fax the TB test over yesterday with the PCP's signature but she did not receive it. Valerie gave me two fax numbers to have the TB results faxed to since they are having fax problems. [phone] or Main Vfe-[phone], Phone [phone]. The patient needs to be admitted by April 4, 2017. Called and unable to reach anyone at PCP's office, will send a message. Will follow up next week.

## 2017-03-24 NOTE — PROGRESS NOTES
03/24/17-Called and left a message for Valerie at [facility]. TB test results were faxed today, did she receive the fax. Valerie called back and they received the TB results. They are going to do paperwork on Monday with patient and daughter.

## 2017-03-24 NOTE — TELEPHONE ENCOUNTER
----- Message from Aneta Tsai sent at 3/24/2017 11:29 AM CDT -----  Contact: Aneta Tsai RN Outpatient Case Mangement EXT. [phone] or [phone]  I spoke to Valerie at [facility]. She did not receive the fax for the TB test but they are having fax problems. She gave me two fax numbers.   Vji-[phone] or Main Fax - [phone]  Phone - [phone].     Thank you for your assistance,   Aneta Tsai RN [facility]

## 2017-03-27 ENCOUNTER — OUTPATIENT CASE MANAGEMENT (OUTPATIENT)
Dept: ADMINISTRATIVE | Facility: OTHER | Age: 82
End: 2017-03-27

## 2017-03-27 NOTE — PROGRESS NOTES
03/27/17-Called and left message for daughter, Millicent if there was anything else I could do to assist her to call me. Will dis enroll and close case at this time.

## 2017-03-28 ENCOUNTER — TELEPHONE (OUTPATIENT)
Dept: INTERNAL MEDICINE | Facility: CLINIC | Age: 82
End: 2017-03-28

## 2017-03-28 NOTE — TELEPHONE ENCOUNTER
----- Message from Rose Jimenes sent at 3/27/2017 10:29 AM CDT -----  Contact: [facility]/// tristan  They would like a copy of visit abbie teran some time ago. Please fax at [phone].any questions please call [phone].

## 2017-03-29 LAB — INR PPP: 1.8

## 2017-03-30 ENCOUNTER — ANTI-COAG VISIT (OUTPATIENT)
Dept: CARDIOLOGY | Facility: CLINIC | Age: 82
End: 2017-03-30

## 2017-03-30 NOTE — PROGRESS NOTES
Faxed result taken from [facility] PT/INR _22.7/1.88_____ Date drawn__3/29_____  Begin 7.5mg daily except 3.75mg on Mondays and Wednesdays. Repeat INR in 1 week.

## 2017-03-31 ENCOUNTER — TELEPHONE (OUTPATIENT)
Dept: PULMONOLOGY | Facility: CLINIC | Age: 82
End: 2017-03-31

## 2017-03-31 DIAGNOSIS — E11.9 TYPE 2 DIABETES MELLITUS WITHOUT COMPLICATION: ICD-10-CM

## 2017-04-03 ENCOUNTER — OFFICE VISIT (OUTPATIENT)
Dept: INTERNAL MEDICINE | Facility: CLINIC | Age: 82
End: 2017-04-03
Payer: MEDICARE

## 2017-04-03 ENCOUNTER — PROCEDURE VISIT (OUTPATIENT)
Dept: PULMONOLOGY | Facility: CLINIC | Age: 82
End: 2017-04-03
Payer: MEDICARE

## 2017-04-03 ENCOUNTER — HOSPITAL ENCOUNTER (OUTPATIENT)
Dept: RADIOLOGY | Facility: HOSPITAL | Age: 82
Discharge: HOME | End: 2017-04-03
Attending: NURSE PRACTITIONER
Payer: MEDICARE

## 2017-04-03 ENCOUNTER — OFFICE VISIT (OUTPATIENT)
Dept: PULMONOLOGY | Facility: CLINIC | Age: 82
End: 2017-04-03
Payer: MEDICARE

## 2017-04-03 VITALS
BODY MASS INDEX: 27.22 KG/M2 | HEIGHT: 72 IN | WEIGHT: 201 LBS | TEMPERATURE: 97 F | OXYGEN SATURATION: 94 % | SYSTOLIC BLOOD PRESSURE: 116 MMHG | HEART RATE: 82 BPM | DIASTOLIC BLOOD PRESSURE: 68 MMHG

## 2017-04-03 VITALS
WEIGHT: 201 LBS | BODY MASS INDEX: 27.22 KG/M2 | WEIGHT: 201.94 LBS | DIASTOLIC BLOOD PRESSURE: 72 MMHG | OXYGEN SATURATION: 93 % | RESPIRATION RATE: 16 BRPM | HEIGHT: 72 IN | SYSTOLIC BLOOD PRESSURE: 109 MMHG | BODY MASS INDEX: 27.35 KG/M2 | HEIGHT: 72 IN | HEART RATE: 88 BPM

## 2017-04-03 DIAGNOSIS — R53.1 GENERALIZED WEAKNESS: ICD-10-CM

## 2017-04-03 DIAGNOSIS — G47.34 NOCTURNAL HYPOXEMIA: ICD-10-CM

## 2017-04-03 DIAGNOSIS — R26.89 POOR BALANCE: ICD-10-CM

## 2017-04-03 DIAGNOSIS — E11.9 TYPE 2 DIABETES MELLITUS WITHOUT COMPLICATION, WITHOUT LONG-TERM CURRENT USE OF INSULIN: Primary | ICD-10-CM

## 2017-04-03 DIAGNOSIS — F32.A DEPRESSION, UNSPECIFIED DEPRESSION TYPE: ICD-10-CM

## 2017-04-03 DIAGNOSIS — R09.02 HYPOXIA: ICD-10-CM

## 2017-04-03 DIAGNOSIS — J10.1 INFLUENZA A: ICD-10-CM

## 2017-04-03 DIAGNOSIS — R06.89 DYSPNEA AND RESPIRATORY ABNORMALITIES: ICD-10-CM

## 2017-04-03 DIAGNOSIS — J90 PLEURAL EFFUSION, LEFT: ICD-10-CM

## 2017-04-03 DIAGNOSIS — J90 PLEURAL EFFUSION: ICD-10-CM

## 2017-04-03 DIAGNOSIS — R09.02 EXERCISE HYPOXEMIA: ICD-10-CM

## 2017-04-03 DIAGNOSIS — J84.9 CHRONIC INTERSTITIAL LUNG DISEASE: Primary | ICD-10-CM

## 2017-04-03 DIAGNOSIS — R06.00 DYSPNEA AND RESPIRATORY ABNORMALITIES: ICD-10-CM

## 2017-04-03 LAB — GLUCOSE SERPL-MCNC: 215 MG/DL (ref 70–110)

## 2017-04-03 PROCEDURE — 99214 OFFICE O/P EST MOD 30 MIN: CPT | Mod: S$PBB,,, | Performed by: FAMILY MEDICINE

## 2017-04-03 PROCEDURE — 99213 OFFICE O/P EST LOW 20 MIN: CPT | Mod: 25,S$PBB,, | Performed by: NURSE PRACTITIONER

## 2017-04-03 PROCEDURE — 99495 TRANSJ CARE MGMT MOD F2F 14D: CPT | Mod: S$PBB,,, | Performed by: FAMILY MEDICINE

## 2017-04-03 PROCEDURE — 82948 REAGENT STRIP/BLOOD GLUCOSE: CPT | Mod: PBBFAC | Performed by: FAMILY MEDICINE

## 2017-04-03 PROCEDURE — 71020 XR CHEST PA AND LATERAL: CPT | Mod: 26,,, | Performed by: RADIOLOGY

## 2017-04-03 PROCEDURE — 99213 OFFICE O/P EST LOW 20 MIN: CPT | Mod: PBBFAC,27 | Performed by: FAMILY MEDICINE

## 2017-04-03 PROCEDURE — 99999 PR PBB SHADOW E&M-EST. PATIENT-LVL III: CPT | Mod: PBBFAC,,, | Performed by: FAMILY MEDICINE

## 2017-04-03 PROCEDURE — 94620 PR PULMONARY STRESS TESTING,SIMPLE: CPT | Mod: 26,S$PBB,, | Performed by: INTERNAL MEDICINE

## 2017-04-03 PROCEDURE — 99999 PR PBB SHADOW E&M-EST. PATIENT-LVL IV: CPT | Mod: PBBFAC,,, | Performed by: NURSE PRACTITIONER

## 2017-04-03 RX ORDER — SERTRALINE HYDROCHLORIDE 25 MG/1
25 TABLET, FILM COATED ORAL DAILY
Qty: 30 TABLET | Refills: 1 | Status: SHIPPED | OUTPATIENT
Start: 2017-04-03 | End: 2018-04-03

## 2017-04-03 RX ORDER — INSULIN PUMP SYRINGE, 3 ML
EACH MISCELLANEOUS
Qty: 1 EACH | Refills: 0 | Status: SHIPPED | OUTPATIENT
Start: 2017-04-03 | End: 2018-04-03

## 2017-04-03 NOTE — PROCEDURES
[facility]  Six Minute Walk     SUMMARY     Ordering Provider: Briana NP      Performing nurse/tech/RT: Julio Cesar RRT  Diagnosis:  (Exercise hypoxemia)  Height: 6' (182.9 cm)  Weight: 91.6 kg (201 lb 15.1 oz)  BMI (Calculated): 27.4   Patient Race:             Phase Oxygen Assessment Supplemental O2 Heart   Rate Blood Pressure Marisela Dyspnea Scale Rating   Resting 93 % Room Air 83 bpm 140/78 0   Exercise        Minute        1 92 % Room Air 90 bpm     2 92 % Room Air 94 bpm     3 93 % Room Air 94 bpm     4 92 % Room Air 92 bpm     5 92 % Room Air 93 bpm     6  91 % Room Air 92 bpm 121/73 1   Recovery        Minute        1 93 % Room Air 92 bpm     2 93 % Room Air 89 bpm     3 92 % Room Air 90 bpm     4 93 % Room Air 88 bpm 109/72 1     Six Minute Walk Summary  6MWT Status: completed with stops        Interpretation:  Did the patient stop or pause?: Yes  How many times did the patient stop or pause?: 1  Stop Time 1: 252  Restart Time 1: 265.2  Pause Time 1: 13.2 seconds                             Total Time Walked (Calculated): 346.8 seconds  Final Partial Lap Distance (feet): 150 feet  Total Distance Meters (Calculated): 106.68 meters  Predicted Distance Meters (Calculated): 487.64 meters  Percentage of Predicted (Calculated): 21.88  Peak VO2 (Calculated): 7.18  Mets: 2.05  Has The Patient Had a Previous Six Minute Walk Test?: No       Previous 6MWT Results  Has The Patient Had a Previous Six Minute Walk Test?: No      REPORT    Only  346.8 seconds of the 360 seconds of the 6 minute walk protocol was completed.  Distance completed was 106 m out of 487 m which was 21% predicted.  Exercise capacity was severely impaired.  Dyspnea score was 0-1.  Heart rate remained stable blood pressure dropped from 140/78 down to 121/73.  Clinical correlation  Lowest oxygen saturation was 91%.  Supplementary oxygen was not required    Joseph Jules MD

## 2017-04-03 NOTE — PROGRESS NOTES
Established Patient    Subjective:      Patient ID: Todd Lee is a 85 y.o. male.    Patient Active Problem List   Diagnosis    Type 2 diabetes mellitus without complication, without long-term current use of insulin    Hyperlipidemia    Hypertension    Osteoarthritis    Elevated PSA    Pulmonary embolus    Hypoxia    Blood clotting disorder    Influenza A    Acute hypoxemic respiratory failure       Problem list has been reviewed.    he has been referred by Samantha Warren NP for evaluation and management for   Chief Complaint   Patient presents with    Pleural Effusion     post flu A with hospitalization, review cxr    Hypoxia     review six minute walk       Chief Complaint: Pleural Effusion (post flu A with hospitalization, review cxr) and Hypoxia (review six minute walk)      HPI:  Patient reports to pulmonary clinic for a follow up visit with his daughter, Millicent.  Prior hx of presenting to [facility] on 3/1/2017 when diagnosed with influenza A with Acute hypoxemic respiratory failure with subsequent admit.  He and his daugther report he is much improved and has been off his oxygen and neb treatment over the past about 10 days.   No cough, no wheezing, no shortness of breath with exertion as when presented for his follow up hospital visit.   Patient daughter states she thinks her Dad might have some depression also, advised would defer evaluation and treatment of depression to Primary Care Provider.   Daughter reports patient sees dr. albarran and Dr. Forman and would like same day appointment since they travel from [place] for appointments. Able to arrange same day  Appt with Dr. Forman.      Previous Report Reviewed: ER records, lab reports, office notes and radiology reports     Past Medical History: The following portions of the patient's history were reviewed and updated as appropriate:   He  has a past surgical history that includes left inguinal hernia repair and Colonoscopy.  His family  history includes Cancer in his father; Stroke in his mother.  He  reports that he has never smoked. He has never used smokeless tobacco. He reports that he does not drink alcohol or use illicit drugs.  He has a current medication list which includes the following prescription(s): acetaminophen-codeine 300-30mg, albuterol, atorvastatin, felodipine, fluticasone, hydrocodone-acetaminophen 10-325mg, meclizine, metformin, and warfarin.  He is allergic to no known drug allergies..    Review of Systems   Constitutional: Negative for fever, chills, weight loss, weight gain, activity change, appetite change, fatigue and night sweats.   HENT: Negative for postnasal drip, rhinorrhea, sinus pressure, voice change and congestion.    Eyes: Negative for redness and itching.   Respiratory: Negative for snoring, cough, sputum production, chest tightness, shortness of breath, wheezing, orthopnea, asthma nighttime symptoms, dyspnea on extertion (resolved), use of rescue inhaler and somnolence.    Cardiovascular: Negative for chest pain, palpitations and leg swelling.   Genitourinary: Negative for difficulty urinating and hematuria.   Endocrine: Negative for polydipsia, polyphagia, cold intolerance, heat intolerance and polyuria.    Musculoskeletal: Negative for arthralgias, gait problem, joint swelling and myalgias.   Skin: Negative.    Gastrointestinal: Negative for nausea, vomiting, abdominal pain and acid reflux.   Neurological: Negative for dizziness, weakness, light-headedness and headaches.   Hematological: Negative for adenopathy. No excessive bruising.   Psychiatric/Behavioral: Negative for sleep disturbance.        Objective:     Physical Exam   Constitutional: He is oriented to person, place, and time. Vital signs are normal. He appears well-developed and well-nourished. He is active and cooperative.  Non-toxic appearance. No distress.   HENT:   Head: Normocephalic and atraumatic.   Nose: Nose normal.   Mouth/Throat:  Oropharynx is clear and moist. No oropharyngeal exudate.   Eyes: Conjunctivae are normal.   Cardiovascular: Normal rate, regular rhythm, normal heart sounds and intact distal pulses.    Pulmonary/Chest: Effort normal and breath sounds normal. No stridor. He has no wheezes. He has no rales.   Abdominal: Soft. Bowel sounds are normal.   Musculoskeletal: Normal range of motion.   Neurological: He is alert and oriented to person, place, and time.   Skin: Skin is warm and dry.   Psychiatric: He has a normal mood and affect. His behavior is normal. Thought content normal.   Vitals reviewed.    Vitals:    04/03/17 1124   BP: 109/72   Pulse: 88   Resp: 16   SpO2: (!) 93%   Weight: 91.2 kg (201 lb)   Height: 6' (1.829 m)   PainSc: 0-No pain     Estimated body mass index is 27.26 kg/(m^2) as calculated from the following:    Height as of this encounter: 6' (1.829 m).    Weight as of this encounter: 91.2 kg (201 lb).    Personal Diagnostic Review    Chest xray 4/3/2017  Chest two views.  Comparison 09/05/2014.    Findings: There is borderline cardiomegaly and aortic uncoiling.  Subtle interstitial opacities in the periphery of both lungs similar to before.  No confluent infiltrate or pleural effusion.  Thoracic spondylosis.   Impression    No acute disease.      Assessment:     1. Chronic interstitial lung disease, mild    2. Nocturnal hypoxemia    3. Pleural effusion Inactive     Orders Placed This Encounter   Procedures    Complete PFT with bronchodilator     Standing Status:   Future     Standing Expiration Date:   4/3/2018    Pulse oximetry overnight     Order Specific Question:   Reason for Test?     Answer:   O2 Qualification     Order Specific Question:   Symptoms:     Answer:   Daytime Fatigue     Order Specific Question:   Oxygen Settings:     Answer:   2 liter/minute     Order Specific Question:   BiPAP Settings:     Answer:   na     Order Specific Question:   CPAP Settings:     Answer:   na     Order Specific  Question:   Room Air:     Answer:   Yes     Plan:     Discussed diagnosis, its evaluation, treatment and usual course. All questions answered.    Chronic interstitial lung disease, mild  Comments:  stable asymtomatic fu with cpft on rtc to evaluate lung function.   Orders:  -     Complete PFT with bronchodilator; Future    Nocturnal hypoxemia  Comments:  daytime hypoxemia improved. 6 min walk today, no desat <=91% on R/A. pt reports improved, no longer sob w/exertion. over night pulse ox to eval sats at night.   Orders:  -     Pulse oximetry overnight    Pleural effusion  Comments:  resolved. No effusions on cxr done today 4/3/2017.       Return in about 3 months (around 7/3/2017) for intersitual lung disease fu w/review cpft. .

## 2017-04-03 NOTE — PROGRESS NOTES
Subjective:       Patient ID: Todd Lee is a 85 y.o. male.    Chief Complaint: Depression and Blood Sugar Problem    HPI Mr. Lee presents for depression and to discuss Metformin. For the past 4-5 weeks his daughter reports he has not wanted to get up and doing anything. He is wanting to stay in bed all day. He says that he doesn't even watch t.v in bed he just lays in the bed because he likes the bed.   He had the flu about a month ago and he has seemed to go down. He had a compression fracture as well but doesn't really complain about the pain.     Daughter reports that he doesn't have much of an appetite. He likes potatoes and steak but everything else he nibbles on.  He has continued to get physical therapy which has helped and daughter would like this extended.     She also inquires about a walker that has a seat for when they are out. Haley a walker but it doesn't have a seat.    She also planned a trip that she paid for a year ago that she can't get money back for and asks for a letter to see if she can get something back because she can't leave her father right now and she doesn't have anyone that can take care of him.     Review of Systems   Constitutional: Negative for fatigue and unexpected weight change.   Cardiovascular: Negative for chest pain, palpitations and leg swelling.   Musculoskeletal: Negative for back pain and joint swelling.   Neurological: Positive for weakness. Negative for dizziness and headaches.   Psychiatric/Behavioral: Positive for decreased concentration. Negative for agitation.        Objective:        Physical Exam   Constitutional: He is oriented to person, place, and time. He appears well-developed and well-nourished.   Sitting with head in his hand  Speaks when spoken to   HENT:   Head: Normocephalic and atraumatic.   Right Ear: External ear normal.   Left Ear: External ear normal.   Eyes: EOM are normal. Pupils are equal, round, and reactive to light.    Cardiovascular: Normal heart sounds.    Pulmonary/Chest: Effort normal and breath sounds normal. No respiratory distress.   Neurological: He is alert and oriented to person, place, and time.   Vitals reviewed.        Assessment/Plan:     Type 2 diabetes mellitus without complication, without long-term current use of insulin  -     POCT Glucose  -     Hemoglobin A1c; Future; Expected date: 4/3/17  -     blood-glucose meter (BLOOD GLUCOSE MONITORING) kit; Use as instructed  Dispense: 1 each; Refill: 0    Poor balance  -     WALKER FOR HOME USE  -     Ambulatory referral to Home Health    Hypoxia  -     WALKER FOR HOME USE  -     Ambulatory referral to Home Health    Generalized weakness  -     WALKER FOR HOME USE  -     Ambulatory referral to Home Health    Depression, unspecified depression type  -     sertraline (ZOLOFT) 25 MG tablet; Take 1 tablet (25 mg total) by mouth once daily.  Dispense: 30 tablet; Refill: 1    Concern for sertraline and warfarin. Found an article  Sertraline, citalopram. As for the remaining SSRIs, Manasa et al16 examined the potential interaction between warfarin and sertraline. Although prothrombin times were increased after 22 days of sertraline exposure, the change was not deemed clinically meaningful. Similar findings have been reported with citalopram.17,18 Therefore, among the SSRIs, both sertraline and citalopram appear to be the safest to prescribe in patients who are taking warfarin  Warfarin and Antidepressants Happiness without Hemorrhaging Shalom Fitzpatrick MD and Peri Fitzpatrick MD    Will monitor INR weekly as already scheduled    Extended home health physical therapy order.     Return if symptoms worsen or fail to improve.    Marjan Forman MD  [facility]   Family Medicine

## 2017-04-03 NOTE — LETTER
April 3, 2017      [facility]  [id number] [place] [address]  Phone: [phone]  Fax: [phone]       Patient: Todd Lee   YOB: 1931  Date of Visit: 04/03/2017    To Whom It May Concern:    Todd Marley was at [facility] on 04/03/2017. He is now under the care of his daughter Millicent Alrbecht. Mr. Brooks is Ms. Albrecht's father as well as . Since his admission to the hospital on 3/1/2017 and subsequent discharge Ms. Albrecht has been his sole care provider and has not had assistance with this responsibility. It would be difficult at this time to leave him as he is not capable of taking care of himself for an extended period of time (days-weeks). Please take this into consideration in regards to trip scheduled 5/4/2017. If you have any questions or concerns, or if I can be of further assistance, please do not hesitate to contact me.          Sincerely,          Marjan Forman MD

## 2017-04-03 NOTE — MR AVS SNAPSHOT
[facility]  [address] [facility] [address]  Phone: [phone]  Fax: [phone]                  Todd Lee   4/3/2017 1:40 PM   Office Visit    Description:  Male : 1931   Provider:  Marjan Forman MD   Department:  [facility] Internal [facility]           Reason for Visit     Depression     Blood Sugar Problem           Diagnoses this Visit        Comments    Type 2 diabetes mellitus without complication, without long-term current use of insulin    -  Primary     Poor balance         Hypoxia         Generalized weakness         Depression, unspecified depression type                To Do List           Future Appointments        Provider Department Dept Phone    4/3/2017 2:45 PM LAB, SAME DAY [facility]-Counts include 234 beds at the [facility] [phone]    2017 10:30 AM PULMONARY LAB, [facility] - Pulm Function Svcs [phone]    2017 11:20 AM Samantha Warren NP [facility] - Pulmonary Services [phone]    2017 8:30 AM Jalil Sorto OD [facility] - Ophthalmology [phone]    2017 2:20 PM Elmer Calderon IV, MD [facility] - Urology [phone]      Goals (5 Years of Data)     COMPLETED: Patient/caregiver will accept life style changes to manage and improve falls prior to discharge from [facility].     Notes - Note edited  3/27/2017  1:47 PM by Aneta Tsai    Overall Time to Completion  3 weeks from 2017    Short Term Goals      Patient/caregiver will identify 1 supports or services to maintain or improve current functional status within 2 weeks.  Interventions   · Collaborate with Physician as appropriate to meet patient needs.  · Discuss alternate Levels of Care with patient/caregiver.  · Empower patient/caregiver to discuss treatment plan with Physician/care team.   Status  · Met          COMPLETED: Patient/caregiver will have knowledge of resources available in order to obtain the services that are needed prior to discharge from  [facility].     Notes - Note edited  3/27/2017  1:47 PM by Aneta Tsai    Overall Time to Completion  1 month from 03/24/2017    Short Term Goals  Patient/caregiver will discuss health care needs with Physician and care team during visits or using Patient Portal.  Interventions   · Collaborate with Physician as appropriate to meet patient needs.  · Discuss alternate Levels of Care with patient/caregiver.  · Discuss appropriate use of Home health with patient/caregiver.  · Empower patient/caregiver to discuss treatment plan with Physician/care team.  · In basket message sent to Physician regarding fax results of TB test to nursing home .  · Provide contact information for [facility] contact information.  · Provide contact information for Outpatient Case Management contact information.  · Provide contact information for Physician office phone number.   Status  · Met    Patient/caregiver will have contact information for identified community resources IE:[facility] for follow-up within 1 week.  Interventions   · Provide contact information for [facility] contact information.   Status  · Met              These Medications        Disp Refills Start End    blood-glucose meter (BLOOD GLUCOSE MONITORING) kit 1 each 0 4/3/2017 4/3/2018    Use as instructed    Pharmacy: [facility] [address] Ph #: [phone]       Notes to Pharmacy: Please give lancets and testing supplies appropriate for checking glucose once a day    sertraline (ZOLOFT) 25 MG tablet 30 tablet 1 4/3/2017 4/3/2018    Take 1 tablet (25 mg total) by mouth once daily. - Oral    Pharmacy: [facility] [address] Ph #: [phone]         [facility]     [facility] Nurse Care Line - 24/7 Assistance  Unless otherwise directed by your provider, please contact [facility], our nurse care line that is available for 24/7 assistance.     Registered nurses in the  [facility] provide: appointment scheduling, clinical advisement, health education, and other advisory services.  Call: [phone] (toll free)               Medications           Message regarding Medications     Verify the changes and/or additions to your medication regime listed below are the same as discussed with your clinician today.  If any of these changes or additions are incorrect, please notify your healthcare provider.        START taking these NEW medications        Refills    blood-glucose meter (BLOOD GLUCOSE MONITORING) kit 0    Sig: Use as instructed    Class: Normal    sertraline (ZOLOFT) 25 MG tablet 1    Sig: Take 1 tablet (25 mg total) by mouth once daily.    Class: Normal    Route: Oral           Verify that the below list of medications is an accurate representation of the medications you are currently taking.  If none reported, the list may be blank. If incorrect, please contact your healthcare provider. Carry this list with you in case of emergency.           Current Medications     acetaminophen-codeine 300-30mg (TYLENOL #3) 300-30 mg Tab Take 1 tablet by mouth every 8 (eight) hours as needed.    albuterol (PROVENTIL) 2.5 mg /3 mL (0.083 %) nebulizer solution Take 3 mLs (2.5 mg total) by nebulization every 6 (six) hours while awake. Rescue    atorvastatin (LIPITOR) 10 MG tablet TAKE 1 TABLET EVERY DAY    blood-glucose meter (BLOOD GLUCOSE MONITORING) kit Use as instructed    felodipine (PLENDIL) 2.5 MG Tb24 TAKE 1 TABLET EVERY DAY    fluticasone (FLONASE) 50 mcg/actuation nasal spray 1 spray by Each Nare route once daily.    hydrocodone-acetaminophen 10-325mg (NORCO)  mg Tab Take 1 tablet by mouth every 4 (four) hours as needed.    meclizine (ANTIVERT) 25 mg tablet Take 1 tablet (25 mg total) by mouth 3 (three) times daily as needed.    metformin (GLUCOPHAGE) 500 MG tablet Take 1 tablet (500 mg total) by mouth 2 (two) times daily with meals.    sertraline (ZOLOFT) 25 MG  tablet Take 1 tablet (25 mg total) by mouth once daily.    warfarin (COUMADIN) 7.5 MG tablet Take 1/2 tablet onand Fridays and 1 tablet all remaining days as directed by the coumadin clinic.           Clinical Reference Information           Your Vitals Were     BP Pulse Temp Height Weight SpO2    116/68 (BP Location: Right arm, Patient Position: Sitting, BP Method: Manual) 82 96.8 °F (36 °C) (Tympanic) 6' (1.829 m) 91.2 kg (201 lb) 94%    BMI                27.26 kg/m2          Blood Pressure          Most Recent Value    BP  116/68      Allergies as of 4/3/2017     No Known Drug Allergies      Immunizations Administered on Date of Encounter - 4/3/2017     None      Orders Placed During Today's Visit      Normal Orders This Visit    POCT Glucose     WALKER FOR HOME USE     Future Labs/Procedures Expected by Expires    Hemoglobin A1c  4/3/2017 6/2/2018         4/3/2017  2:25 PM - Annalisedev Dia LPN      Component Results     Component Value Flag Ref Range Units Status    POC Glucose 215 (A) 70 - 110 mg/dL Final    Comment:    Dr Forman notified//pt ate a biscuit and jay milk//am            [facility] Sign-Up     Activating your [facility] account is as easy as 1-2-3!     1) Visit [email or web address], select Sign Up Now, enter this activation code and your date of birth, then select Next.  [email or web address]  Expires: 5/18/2017 12:01 PM      2) Create a username and password to use when you visit [facility] in the future and select a security question in case you lose your password and select Next.    3) Enter your e-mail address and click Sign Up!    Additional Information  If you have questions, please e-mail [email or web address] or call [phone] to talk to our [facility] staff. Remember, [facility] is NOT to be used for urgent needs. For medical emergencies, dial 911.         Language Assistance Services     ATTENTION: Language assistance services are available, free of charge. Please call [phone].       ATENCIÓN: Si habla español, tiene a livingston disposición servicios gratuitos de asistencia lingüística. Dom al [phone].     CHÚ Ý: N?u b?n nói Ti?ng Vi?t, có các d?ch v? h? tr? ngôn ng? mi?n phí dành cho b?n. G?i s? [phone].         [facility] complies with applicable Federal civil rights laws and does not discriminate on the basis of race, color, national origin, age, disability, or sex.

## 2017-04-03 NOTE — MR AVS SNAPSHOT
[facility]  [address]  [place] [address]  Phone: [phone]  Fax: [phone]                  Todd Lee   4/3/2017 11:00 AM   Office Visit    Description:  Male : 1931   Provider:  Samantha Warren NP   Department:  [facility]           Reason for Visit     Pleural Effusion     Hypoxia           Diagnoses this Visit        Comments    Chronic interstitial lung disease    -  Primary stable asymtomatic fu with cpft on rtc to evaluate lung function.     Nocturnal hypoxemia     daytime hypoxemia improved. 6 min walk today, no desat <=91% on R/A. pt reports improved, no longer sob w/exertion. over night pulse ox to eval sats at night.     Pleural effusion     resolved. No effusions on cxr done today 4/3/2017.            To Do List           Future Appointments        Provider Department Dept Phone    4/3/2017 1:40 PM Marjan Forman MD [facility] [phone]    4/3/2017 2:40 PM PULMONARY LAB, [facility] [phone]    2017 10:30 AM [facility]MON[facility], [facility] - Pulm Function [facility] [phone]    2017 11:20 AM Samantha Warren NP [facility] Pulmonary Services [phone]    2017 8:30 AM Jalil Sorto OD [facility] Ophthalmology [phone]      Goals (5 Years of Data)     COMPLETED: Patient/caregiver will accept life style changes to manage and improve falls prior to discharge from [facility].     Notes - Note edited  3/27/2017  1:47 PM by Aneta Tsai    Overall Time to Completion  3 weeks from 2017    Short Term Goals      Patient/caregiver will identify 1 supports or services to maintain or improve current functional status within 2 weeks.  Interventions   · Collaborate with Physician as appropriate to meet patient needs.  · Discuss alternate Levels of Care with patient/caregiver.  · Empower patient/caregiver to discuss treatment plan with Physician/care team.    Status  · Met          COMPLETED: Patient/caregiver will have knowledge of resources available in order to obtain the services that are needed [facility]     Notes - Note edited  3/27/2017  1:47 PM by Aneta Tsai    Overall Time to Completion  1 month from 03/24/2017    Short Term Goals  Patient/caregiver will discuss health care needs with Physician and care team during visits or using Patient Portal.  Interventions   · Collaborate with Physician as appropriate to meet patient needs.  · Discuss alternate Levels of Care with patient/caregiver.  · Discuss appropriate use of Home health with patient/caregiver.  · Empower patient/caregiver to discuss treatment plan with Physician/care team.  · In basket message sent to Physician regarding fax results of TB test to nursing home .  · Provide contact information for [facility] contact information.  · Provide contact information for Outpatient Case Management contact information.  · Provide contact information for Physician office phone number.   Status  · Met    Patient/caregiver will have contact information for identified community resources IE:[facility] for follow-up within 1 week.  Interventions   · Provide contact information for [facility] contact information.   Status  · Met             Follow-Up and Disposition     Return in about 3 months (around 7/3/2017) for intersitual lung disease fu w/review cpft. .      [facility]     [facility] Nurse Care Line - 24/7 Assistance  Unless otherwise directed by your provider, please contact [facility], our nurse care line that is available for 24/7 assistance.     Registered nurses in the [facility] On Call Center provide: appointment scheduling, clinical advisement, health education, and other advisory services.  Call: [phone] (toll free)               Medications           Message regarding Medications     Verify the changes and/or additions to your medication regime  listed below are the same as discussed with your clinician today.  If any of these changes or additions are incorrect, please notify your healthcare provider.             Verify that the below list of medications is an accurate representation of the medications you are currently taking.  If none reported, the list may be blank. If incorrect, please contact your healthcare provider. Carry this list with you in case of emergency.           Current Medications     acetaminophen-codeine 300-30mg (TYLENOL #3) 300-30 mg Tab Take 1 tablet by mouth every 8 (eight) hours as needed.    albuterol (PROVENTIL) 2.5 mg /3 mL (0.083 %) nebulizer solution Take 3 mLs (2.5 mg total) by nebulization every 6 (six) hours while awake. Rescue    atorvastatin (LIPITOR) 10 MG tablet TAKE 1 TABLET EVERY DAY    felodipine (PLENDIL) 2.5 MG Tb24 TAKE 1 TABLET EVERY DAY    fluticasone (FLONASE) 50 mcg/actuation nasal spray 1 spray by Each Nare route once daily.    hydrocodone-acetaminophen 10-325mg (NORCO)  mg Tab Take 1 tablet by mouth every 4 (four) hours as needed.    meclizine (ANTIVERT) 25 mg tablet Take 1 tablet (25 mg total) by mouth 3 (three) times daily as needed.    metformin (GLUCOPHAGE) 500 MG tablet Take 1 tablet (500 mg total) by mouth 2 (two) times daily with meals.    warfarin (COUMADIN) 7.5 MG tablet Take 1/2 tablet onand Fridays and 1 tablet all remaining days as directed by the coumadin clinic.           Clinical Reference Information           Your Vitals Were     BP Pulse Resp Height Weight SpO2    109/72 (BP Location: Left arm, Patient Position: Sitting, BP Method: Automatic) 88 16 6' (1.829 m) 91.2 kg (201 lb) 93%    BMI                27.26 kg/m2          Blood Pressure          Most Recent Value    BP  109/72      Allergies as of 4/3/2017     No Known Drug Allergies      Immunizations Administered on Date of Encounter - 4/3/2017     None      Orders Placed During Today's Visit      Normal Orders This Visit    Pulse  oximetry overnight     Future Labs/Procedures Expected by Expires    Complete PFT with bronchodilator  As directed 4/3/2018      [facility] Sign-Up     Activating your [facility] account is as easy as 1-2-3!     1) Visit [email or web address], select Sign Up Now, enter this activation code and your date of birth, then select Next.  [email or web address]  Expires: 5/18/2017 12:01 PM      2) Create a username and password to use when you visit [facility] in the future and select a security question in case you lose your password and select Next.    3) Enter your e-mail address and click Sign Up!    Additional Information  If you have questions, please e-mail [email or web address] or call [phone] to talk to our [facility] staff. Remember, [facility] is NOT to be used for urgent needs. For medical emergencies, dial 911.         Language Assistance Services     ATTENTION: Language assistance services are available, free of charge. Please call [phone].      ATENCIÓN: Si habla español, tiene a [place] disposición servicios gratuitos de asistencia lingüística. Llame al [phone].     CHÚ Ý: N?u b?n nói Ti?ng Vi?t, có các d?ch v? h? tr? ngôn ng? mi?n phí dành cho b?n. G?i s? [phone].         [facility] complies with applicable Federal civil rights laws and does not discriminate on the basis of race, color, national origin, age, disability, or sex.

## 2017-04-04 ENCOUNTER — TELEPHONE (OUTPATIENT)
Dept: INTERNAL MEDICINE | Facility: CLINIC | Age: 82
End: 2017-04-04

## 2017-04-04 NOTE — TELEPHONE ENCOUNTER
----- Message from Marjan Forman MD sent at 4/4/2017  1:14 PM CDT -----  A1c was even higher will increase metformin to 1000 mg twice a day. Have to watch breads and sugars this increased glucose.

## 2017-04-04 NOTE — TELEPHONE ENCOUNTER
Patient/spouse notified of results and of the recommendation to increase Metformin to 1000 mg twice a day and watch diet. Advised to call the office as needed, patient verbalized understanding.

## 2017-04-06 ENCOUNTER — ANTI-COAG VISIT (OUTPATIENT)
Dept: CARDIOLOGY | Facility: CLINIC | Age: 82
End: 2017-04-06

## 2017-04-06 LAB — INR PPP: 2.1

## 2017-04-06 NOTE — PROGRESS NOTES
Faxed result taken from __[facility] _______. PT/INR __2.1___ Date drawn__4/6____ continue dose, repeat INR in 1 week.

## 2017-04-13 ENCOUNTER — TELEPHONE (OUTPATIENT)
Dept: INTERNAL MEDICINE | Facility: CLINIC | Age: 82
End: 2017-04-13

## 2017-04-13 ENCOUNTER — ANTI-COAG VISIT (OUTPATIENT)
Dept: CARDIOLOGY | Facility: CLINIC | Age: 82
End: 2017-04-13

## 2017-04-13 LAB — INR PPP: 2.5

## 2017-04-13 NOTE — TELEPHONE ENCOUNTER
----- Message from Nathanael Hernandez sent at 4/11/2017 11:23 AM CDT -----  Contact: Amy Ricks -[phone]   Would like to consult with the nurse about fax, would like to verify if fax was received.  Please call back @ [phone].  Thanks-[facility]

## 2017-04-13 NOTE — TELEPHONE ENCOUNTER
Notified pts daughter, Millicent, that Dr Forman completed the form and faxed back. She verbalized understanding.

## 2017-04-13 NOTE — PROGRESS NOTES
Faxed result taken from __[facility]  _______. PT/INR __2.5____ Date drawn___4/13____ Continue dose, repeat INR in 2 weeks.

## 2017-04-17 ENCOUNTER — OUTPATIENT CASE MANAGEMENT (OUTPATIENT)
Dept: ADMINISTRATIVE | Facility: OTHER | Age: 82
End: 2017-04-17

## 2017-04-17 NOTE — PROGRESS NOTES
04/17/17-Received a phone call from daughterMillicent asking me to call her at [phone]. Called and spoke to Millicent, daughter, she is asking about placing her dad in a nursing home for FDC care. Since the patient was just in a nursing home skilled and discharged home he had a chest xray and labs already done two or three weeks ago. Patient needs to go into the nursing home FDC and then be discharged home to receive long term care from his insurance. Told Millicent to call the nursing home and explain to them that patient just had a chest xray and labs done several weeks ago would he have to have new ones. Patient will need  a new TB test to be placed in the nursing home. Patient's PCP should be able to provide all the notes and test that the nursing home needs. Millicent to call the nursing home for what they will need for the patient to be admitted to them. Millicent will call the doctor for the patient's TB test and any other further test he will need for admittance. Millicent to call if I can assist her with any further needs.

## 2017-04-25 RX ORDER — FELODIPINE 2.5 MG/1
TABLET, EXTENDED RELEASE ORAL
Qty: 90 TABLET | Refills: 1 | Status: SHIPPED | OUTPATIENT
Start: 2017-04-25 | End: 2017-05-02 | Stop reason: SDUPTHER

## 2017-04-25 RX ORDER — ATORVASTATIN CALCIUM 10 MG/1
TABLET, FILM COATED ORAL
Qty: 90 TABLET | Refills: 1 | Status: SHIPPED | OUTPATIENT
Start: 2017-04-25 | End: 2017-05-02 | Stop reason: SDUPTHER

## 2017-05-02 RX ORDER — ATORVASTATIN CALCIUM 10 MG/1
10 TABLET, FILM COATED ORAL DAILY
Qty: 90 TABLET | Refills: 1 | Status: SHIPPED | OUTPATIENT
Start: 2017-05-02

## 2017-05-02 RX ORDER — FELODIPINE 2.5 MG/1
2.5 TABLET, EXTENDED RELEASE ORAL DAILY
Qty: 90 TABLET | Refills: 1 | Status: SHIPPED | OUTPATIENT
Start: 2017-05-02

## 2017-06-09 ENCOUNTER — ANTI-COAG VISIT (OUTPATIENT)
Dept: CARDIOLOGY | Facility: CLINIC | Age: 82
End: 2017-06-09

## 2017-07-19 DIAGNOSIS — Z79.01 LONG TERM (CURRENT) USE OF ANTICOAGULANTS: ICD-10-CM

## 2017-07-20 RX ORDER — WARFARIN 7.5 MG/1
TABLET ORAL
Qty: 30 TABLET | Refills: 3 | Status: SHIPPED | OUTPATIENT
Start: 2017-07-20

## 2017-09-25 ENCOUNTER — PATIENT OUTREACH (OUTPATIENT)
Dept: ADMINISTRATIVE | Facility: HOSPITAL | Age: 82
End: 2017-09-25

## 2017-09-25 NOTE — PROGRESS NOTES
Spoke with Mr Jesus daughter regarding due for follow up lab and dr couch for diabetes. She states he has changed to a local dr in [place]

## 2018-04-20 RX ORDER — FELODIPINE 2.5 MG/1
TABLET, EXTENDED RELEASE ORAL
Qty: 90 TABLET | Refills: 1 | OUTPATIENT
Start: 2018-04-20

## 2018-04-20 RX ORDER — ATORVASTATIN CALCIUM 10 MG/1
TABLET, FILM COATED ORAL
Qty: 90 TABLET | Refills: 1 | OUTPATIENT
Start: 2018-04-20

## 2018-04-24 NOTE — TELEPHONE ENCOUNTER
Patient daughter states they are currently seeing a different doctor. She will notify the pharmacy to send it to his PCP.

## 2018-05-01 RX ORDER — ATORVASTATIN CALCIUM 10 MG/1
10 TABLET, FILM COATED ORAL DAILY
Qty: 90 TABLET | Refills: 1 | OUTPATIENT
Start: 2018-05-01

## 2018-05-24 RX ORDER — FELODIPINE 2.5 MG/1
TABLET, EXTENDED RELEASE ORAL
Qty: 90 TABLET | Refills: 1 | OUTPATIENT
Start: 2018-05-24

## 2018-05-24 RX ORDER — ATORVASTATIN CALCIUM 10 MG/1
TABLET, FILM COATED ORAL
Qty: 90 TABLET | Refills: 1 | OUTPATIENT
Start: 2018-05-24
